# Patient Record
Sex: FEMALE | Race: BLACK OR AFRICAN AMERICAN | NOT HISPANIC OR LATINO | URBAN - METROPOLITAN AREA
[De-identification: names, ages, dates, MRNs, and addresses within clinical notes are randomized per-mention and may not be internally consistent; named-entity substitution may affect disease eponyms.]

---

## 2023-04-04 DIAGNOSIS — Z00.00 HEALTHCARE MAINTENANCE: Primary | ICD-10-CM

## 2023-04-04 RX ORDER — CANDESARTAN 32 MG/1
1 TABLET ORAL DAILY
COMMUNITY
Start: 2020-08-17 | End: 2023-07-25 | Stop reason: SDUPTHER

## 2023-04-04 RX ORDER — VALACYCLOVIR HYDROCHLORIDE 500 MG/1
500 TABLET, FILM COATED ORAL DAILY
Qty: 90 TABLET | Refills: 0 | Status: SHIPPED | OUTPATIENT
Start: 2023-04-04 | End: 2023-07-25 | Stop reason: SDUPTHER

## 2023-04-04 RX ORDER — VALACYCLOVIR HYDROCHLORIDE 500 MG/1
1 TABLET, FILM COATED ORAL DAILY
COMMUNITY
Start: 2014-10-13 | End: 2023-04-04 | Stop reason: SDUPTHER

## 2023-04-04 RX ORDER — AMLODIPINE BESYLATE 10 MG/1
1 TABLET ORAL DAILY
COMMUNITY
Start: 2021-01-15 | End: 2023-11-13 | Stop reason: SDUPTHER

## 2023-04-04 RX ORDER — NAPROXEN 500 MG/1
TABLET ORAL
COMMUNITY
Start: 2021-12-03 | End: 2024-01-22 | Stop reason: HOSPADM

## 2023-07-25 DIAGNOSIS — Z00.00 HEALTHCARE MAINTENANCE: ICD-10-CM

## 2023-07-25 DIAGNOSIS — I10 ESSENTIAL HYPERTENSION: Primary | ICD-10-CM

## 2023-07-26 RX ORDER — VALACYCLOVIR HYDROCHLORIDE 500 MG/1
500 TABLET, FILM COATED ORAL DAILY
Qty: 90 TABLET | Refills: 0 | Status: SHIPPED | OUTPATIENT
Start: 2023-07-26 | End: 2023-11-13 | Stop reason: SDUPTHER

## 2023-07-26 RX ORDER — CANDESARTAN 32 MG/1
32 TABLET ORAL DAILY
Qty: 30 TABLET | Refills: 1 | Status: SHIPPED | OUTPATIENT
Start: 2023-07-26 | End: 2023-10-11 | Stop reason: SDUPTHER

## 2023-10-10 DIAGNOSIS — Z00.00 HEALTHCARE MAINTENANCE: ICD-10-CM

## 2023-10-10 DIAGNOSIS — I10 ESSENTIAL HYPERTENSION: ICD-10-CM

## 2023-10-10 NOTE — TELEPHONE ENCOUNTER
LAST BSEEN 1/4/23, NEXT APPT 11/13  REFILLS CANDESARTAN VALACYCLOVIR       PH[facility]ACY: [facility]

## 2023-10-11 DIAGNOSIS — I10 ESSENTIAL HYPERTENSION: ICD-10-CM

## 2023-10-11 RX ORDER — CANDESARTAN 32 MG/1
32 TABLET ORAL DAILY
Qty: 30 TABLET | Refills: 1 | OUTPATIENT
Start: 2023-10-11 | End: 2023-12-10

## 2023-10-11 RX ORDER — CANDESARTAN 32 MG/1
32 TABLET ORAL DAILY
Qty: 30 TABLET | Refills: 0 | Status: SHIPPED | OUTPATIENT
Start: 2023-10-11 | End: 2023-11-13 | Stop reason: SDUPTHER

## 2023-10-11 RX ORDER — VALACYCLOVIR HYDROCHLORIDE 500 MG/1
500 TABLET, FILM COATED ORAL DAILY
Qty: 90 TABLET | Refills: 0 | OUTPATIENT
Start: 2023-10-11

## 2023-10-18 ENCOUNTER — TELEPHONE (OUTPATIENT)
Dept: PRIMARY CARE | Facility: CLINIC | Age: 48
End: 2023-10-18
Payer: COMMERCIAL

## 2023-11-13 ENCOUNTER — LAB (OUTPATIENT)
Dept: LAB | Facility: LAB | Age: 48
End: 2023-11-13
Payer: COMMERCIAL

## 2023-11-13 ENCOUNTER — OFFICE VISIT (OUTPATIENT)
Dept: PRIMARY CARE | Facility: CLINIC | Age: 48
End: 2023-11-13
Payer: COMMERCIAL

## 2023-11-13 VITALS
HEIGHT: 67 IN | DIASTOLIC BLOOD PRESSURE: 74 MMHG | RESPIRATION RATE: 16 BRPM | WEIGHT: 223.4 LBS | SYSTOLIC BLOOD PRESSURE: 132 MMHG | OXYGEN SATURATION: 97 % | BODY MASS INDEX: 35.06 KG/M2 | TEMPERATURE: 97.7 F | HEART RATE: 66 BPM

## 2023-11-13 DIAGNOSIS — E55.9 VITAMIN D DEFICIENCY: ICD-10-CM

## 2023-11-13 DIAGNOSIS — E78.2 MIXED HYPERLIPIDEMIA: ICD-10-CM

## 2023-11-13 DIAGNOSIS — Z00.00 HEALTHCARE MAINTENANCE: ICD-10-CM

## 2023-11-13 DIAGNOSIS — R73.9 HYPERGLYCEMIA: ICD-10-CM

## 2023-11-13 DIAGNOSIS — Z12.31 BREAST CANCER SCREENING BY MAMMOGRAM: ICD-10-CM

## 2023-11-13 DIAGNOSIS — I10 ESSENTIAL HYPERTENSION: Primary | ICD-10-CM

## 2023-11-13 DIAGNOSIS — I10 ESSENTIAL HYPERTENSION: ICD-10-CM

## 2023-11-13 DIAGNOSIS — Z00.00 PHYSICAL EXAM: ICD-10-CM

## 2023-11-13 DIAGNOSIS — B00.9 HERPES: ICD-10-CM

## 2023-11-13 PROBLEM — F32.A ANXIETY AND DEPRESSION: Status: ACTIVE | Noted: 2023-11-13

## 2023-11-13 PROBLEM — F41.9 ANXIETY AND DEPRESSION: Status: ACTIVE | Noted: 2023-11-13

## 2023-11-13 PROBLEM — D64.9 ANEMIA: Status: ACTIVE | Noted: 2023-11-13

## 2023-11-13 PROBLEM — E78.5 HYPERLIPIDEMIA: Status: ACTIVE | Noted: 2023-11-13

## 2023-11-13 PROBLEM — R73.03 PREDIABETES: Status: ACTIVE | Noted: 2023-11-13

## 2023-11-13 PROBLEM — E66.9 OBESITY (BMI 30-39.9): Status: ACTIVE | Noted: 2023-11-13

## 2023-11-13 LAB
25(OH)D3 SERPL-MCNC: 23 NG/ML (ref 30–100)
ALBUMIN SERPL BCP-MCNC: 4.3 G/DL (ref 3.4–5)
ALP SERPL-CCNC: 66 U/L (ref 33–110)
ALT SERPL W P-5'-P-CCNC: 14 U/L (ref 7–45)
ANION GAP SERPL CALC-SCNC: 10 MMOL/L (ref 10–20)
AST SERPL W P-5'-P-CCNC: 13 U/L (ref 9–39)
BASOPHILS # BLD AUTO: 0.03 X10*3/UL (ref 0–0.1)
BASOPHILS NFR BLD AUTO: 0.5 %
BILIRUB SERPL-MCNC: 0.5 MG/DL (ref 0–1.2)
BUN SERPL-MCNC: 15 MG/DL (ref 6–23)
CALCIUM SERPL-MCNC: 9.4 MG/DL (ref 8.6–10.6)
CHLORIDE SERPL-SCNC: 106 MMOL/L (ref 98–107)
CHOLEST SERPL-MCNC: 185 MG/DL (ref 0–199)
CHOLESTEROL/HDL RATIO: 5.8
CO2 SERPL-SCNC: 29 MMOL/L (ref 21–32)
CREAT SERPL-MCNC: 0.87 MG/DL (ref 0.5–1.05)
EOSINOPHIL # BLD AUTO: 0.07 X10*3/UL (ref 0–0.7)
EOSINOPHIL NFR BLD AUTO: 1.2 %
ERYTHROCYTE [DISTWIDTH] IN BLOOD BY AUTOMATED COUNT: 12.3 % (ref 11.5–14.5)
EST. AVERAGE GLUCOSE BLD GHB EST-MCNC: 114 MG/DL
GFR SERPL CREATININE-BSD FRML MDRD: 81 ML/MIN/1.73M*2
GLUCOSE SERPL-MCNC: 80 MG/DL (ref 74–99)
HBA1C MFR BLD: 5.6 %
HCT VFR BLD AUTO: 42.8 % (ref 36–46)
HDLC SERPL-MCNC: 31.7 MG/DL
HGB BLD-MCNC: 14 G/DL (ref 12–16)
HIV 1+2 AB+HIV1 P24 AG SERPL QL IA: NONREACTIVE
IMM GRANULOCYTES # BLD AUTO: 0.01 X10*3/UL (ref 0–0.7)
IMM GRANULOCYTES NFR BLD AUTO: 0.2 % (ref 0–0.9)
LDLC SERPL CALC-MCNC: 100 MG/DL
LYMPHOCYTES # BLD AUTO: 2.56 X10*3/UL (ref 1.2–4.8)
LYMPHOCYTES NFR BLD AUTO: 45.2 %
MCH RBC QN AUTO: 30.2 PG (ref 26–34)
MCHC RBC AUTO-ENTMCNC: 32.7 G/DL (ref 32–36)
MCV RBC AUTO: 92 FL (ref 80–100)
MONOCYTES # BLD AUTO: 0.44 X10*3/UL (ref 0.1–1)
MONOCYTES NFR BLD AUTO: 7.8 %
NEUTROPHILS # BLD AUTO: 2.55 X10*3/UL (ref 1.2–7.7)
NEUTROPHILS NFR BLD AUTO: 45.1 %
NON HDL CHOLESTEROL: 153 MG/DL (ref 0–149)
NRBC BLD-RTO: 0 /100 WBCS (ref 0–0)
PLATELET # BLD AUTO: 279 X10*3/UL (ref 150–450)
POTASSIUM SERPL-SCNC: 4.4 MMOL/L (ref 3.5–5.3)
PROT SERPL-MCNC: 7.3 G/DL (ref 6.4–8.2)
RBC # BLD AUTO: 4.63 X10*6/UL (ref 4–5.2)
SODIUM SERPL-SCNC: 141 MMOL/L (ref 136–145)
TRIGL SERPL-MCNC: 269 MG/DL (ref 0–149)
TSH SERPL-ACNC: 1.33 MIU/L (ref 0.44–3.98)
VLDL: 54 MG/DL (ref 0–40)
WBC # BLD AUTO: 5.7 X10*3/UL (ref 4.4–11.3)

## 2023-11-13 PROCEDURE — 3075F SYST BP GE 130 - 139MM HG: CPT | Performed by: FAMILY MEDICINE

## 2023-11-13 PROCEDURE — 99214 OFFICE O/P EST MOD 30 MIN: CPT | Performed by: FAMILY MEDICINE

## 2023-11-13 PROCEDURE — 36415 COLL VENOUS BLD VENIPUNCTURE: CPT

## 2023-11-13 PROCEDURE — 84443 ASSAY THYROID STIM HORMONE: CPT

## 2023-11-13 PROCEDURE — 82306 VITAMIN D 25 HYDROXY: CPT

## 2023-11-13 PROCEDURE — 1036F TOBACCO NON-USER: CPT | Performed by: FAMILY MEDICINE

## 2023-11-13 PROCEDURE — 80053 COMPREHEN METABOLIC PANEL: CPT

## 2023-11-13 PROCEDURE — 85025 COMPLETE CBC W/AUTO DIFF WBC: CPT

## 2023-11-13 PROCEDURE — 87389 HIV-1 AG W/HIV-1&-2 AB AG IA: CPT

## 2023-11-13 PROCEDURE — 83036 HEMOGLOBIN GLYCOSYLATED A1C: CPT

## 2023-11-13 PROCEDURE — 3078F DIAST BP <80 MM HG: CPT | Performed by: FAMILY MEDICINE

## 2023-11-13 PROCEDURE — 80061 LIPID PANEL: CPT

## 2023-11-13 RX ORDER — AMLODIPINE BESYLATE 10 MG/1
10 TABLET ORAL DAILY
Qty: 90 TABLET | Refills: 0 | Status: SHIPPED | OUTPATIENT
Start: 2023-11-13 | End: 2024-06-07 | Stop reason: SDUPTHER

## 2023-11-13 RX ORDER — VALACYCLOVIR HYDROCHLORIDE 500 MG/1
500 TABLET, FILM COATED ORAL DAILY
Qty: 90 TABLET | Refills: 1 | Status: SHIPPED | OUTPATIENT
Start: 2023-11-13 | End: 2024-06-07 | Stop reason: SDUPTHER

## 2023-11-13 RX ORDER — CANDESARTAN 32 MG/1
32 TABLET ORAL DAILY
Qty: 90 TABLET | Refills: 1 | Status: SHIPPED | OUTPATIENT
Start: 2023-11-13 | End: 2024-06-07 | Stop reason: SDUPTHER

## 2023-11-13 ASSESSMENT — COLUMBIA-SUICIDE SEVERITY RATING SCALE - C-SSRS
1. IN THE PAST MONTH, HAVE YOU WISHED YOU WERE DEAD OR WISHED YOU COULD GO TO SLEEP AND NOT WAKE UP?: NO
2. HAVE YOU ACTUALLY HAD ANY THOUGHTS OF KILLING YOURSELF?: NO
6. HAVE YOU EVER DONE ANYTHING, STARTED TO DO ANYTHING, OR PREPARED TO DO ANYTHING TO END YOUR LIFE?: NO

## 2023-11-13 ASSESSMENT — ENCOUNTER SYMPTOMS
LOSS OF SENSATION IN FEET: 0
OCCASIONAL FEELINGS OF UNSTEADINESS: 0
DEPRESSION: 0

## 2023-11-13 ASSESSMENT — PATIENT HEALTH QUESTIONNAIRE - PHQ9
2. FEELING DOWN, DEPRESSED OR HOPELESS: NOT AT ALL
1. LITTLE INTEREST OR PLEASURE IN DOING THINGS: NOT AT ALL
SUM OF ALL RESPONSES TO PHQ9 QUESTIONS 1 AND 2: 0

## 2023-11-13 NOTE — PROGRESS NOTES
Subjective   Patient ID: Krystyna Arroyo is a 48 y.o. female who presents for Follow-up.    HPI   The patient presents to the clinic for a 3-month follow-up. She has past medical history of hypertension.    She has been compliant with medication and denies any side-effects to current medication.    The patient's blood pressure was when checked in the clinic today. She has been using amlodipine 10 mg and candersartan 32 mg to control her blood pressure. The patient also inquires about the accuracy of phone applications that monitor blood pressure. She does have a blood pressure cuff to monitor at home which she uses to monitor her BP occasionally.  She states has been in very good range    She reports that she lost weight (current weight: 223 lbs, down 7 lbs from 01/04/2023).  She did see dietician for several visits and has helped her    The patient has stopped taking her vitamin D supplement once she ran out of her current supply. The patient also reports that she discontinued use of her vitamin B complex supplement as she had concerns with bright yellow urine.  She wants D level checked w labs today.  Assured her that normal for B complex to cause her urine to turn bright yellow and why    The patient reports that she visited a periodontist as she was diagnosed with gum disease. She will have surgery regarding gum disease in the near future (~next 90 days). The patient states that she wants straight teeth and was advised to remove 2 teeth from her specialist. She does not want to have any unnecessary procedures and plans to evaluate her options.    She has family history of breast cancer with her mother (current age: 68 y/o).  Recently dx with stage 4 breast cancer and undergoing tx.  Pt is behind on mamm by 6 months, will order for her today.  She is scheduling w Dr Hayes/Timothy for her annual gyn OV    Review of Systems    Objective   /74 (BP Location: Right arm, Patient Position: Sitting, BP Cuff Size:  "Large adult)   Pulse 66   Temp 36.5 °C (97.7 °F)   Resp 16   Ht 1.702 m (5' 7\")   Wt 101 kg (223 lb 6.4 oz)   SpO2 97%   BMI 34.99 kg/m²     Physical Exam  Neck:      Vascular: No carotid bruit.   Cardiovascular:      Rate and Rhythm: Normal rate and regular rhythm.      Pulses: Normal pulses.      Heart sounds: Normal heart sounds.   Pulmonary:      Effort: Pulmonary effort is normal.      Breath sounds: Normal breath sounds.   Musculoskeletal:         General: Normal range of motion.      Cervical back: Normal range of motion.      Right lower leg: No edema.      Left lower leg: No edema.   Skin:     General: Skin is warm and dry.   Neurological:      Mental Status: She is alert and oriented to person, place, and time.   Psychiatric:         Mood and Affect: Mood normal.         Behavior: Behavior normal.         Thought Content: Thought content normal.         Judgment: Judgment normal.         Assessment/Plan   Problem List Items Addressed This Visit             ICD-10-CM    Herpes B00.9    Hyperlipidemia E78.5    Vitamin D deficiency E55.9    Relevant Orders    Vitamin D 25-Hydroxy,Total (for eval of Vitamin D levels)     Other Visit Diagnoses         Codes    Essential hypertension    -  Primary I10    Relevant Medications    candesartan (Atacand) 32 mg tablet    amLODIPine (Norvasc) 10 mg tablet    Other Relevant Orders    TSH with reflex to Free T4 if abnormal    Hyperglycemia     R73.9    Relevant Orders    Hemoglobin A1C    TSH with reflex to Free T4 if abnormal    Breast cancer screening by mammogram     Z12.31    Relevant Orders    BI mammo bilateral screening tomosynthesis    Healthcare maintenance     Z00.00    Relevant Medications    valACYclovir (Valtrex) 500 mg tablet    Physical exam     Z00.00    Relevant Orders    CBC and Auto Differential    Comprehensive Metabolic Panel    Lipid Panel    HIV 1/2 Antigen/Antibody Screen with Reflex to Confirmation               Labs (CBC, CMP, A1C, lipid " panel, TSH, vitamin D, HIV 1/2 antigen/antibody screen) were ordered for the patient. She is fasting and intends to get her labs done today. The clinic will call the patient upon receiving the results of her labs.    A bilateral mammogram screening was ordered for the patient. She will schedule with gyn for OV    The accuracy of phone application blood pressure monitors were discussed with the patient. In addition, the proper method of measuring BP with a traditional monitor was discussed with the patient. She was advised to check the accuracy of her application with a traditional BP monitor if she prefers to use phone applications.    The patient received refills for her current medication (amlodipine 10 mg, candersartan 32 mg, valacyclovir 500 mg). She was advised to continue using medication as previously directed.    In regards to her concerns with bright yellow urine when taking her vitamin B complex supplement, the patient was advised that this is normal and to continue taking the supplement as previously directed.  Checking D level and will advise on dose to restart also    The patient had a cologuard on 03/30/2023. She was advised to have a repeat cologuard/colonoscopy in 3 years. She was advised to consider having a colonoscopy screening at least one time.    The patient received a flu vaccine in the clinic today.    6 month follow up or prn sooner    Scribe Attestation  By signing my name below, I, Javi Teague , [employer]   attest that this documentation has been prepared under the direction and in the presence of Kika Fernandez DO.

## 2023-11-20 ENCOUNTER — TELEPHONE (OUTPATIENT)
Dept: PRIMARY CARE | Facility: CLINIC | Age: 48
End: 2023-11-20
Payer: COMMERCIAL

## 2023-11-20 NOTE — TELEPHONE ENCOUNTER
----- Message from Kika Fernandez DO sent at 11/16/2023  7:41 PM EST -----  Report to pt her blood count is normal, her chemistries are normal including hgba1c and BS.  Cholesterol good range and TG high.  HIV neg

## 2023-11-22 ENCOUNTER — APPOINTMENT (OUTPATIENT)
Dept: RADIOLOGY | Facility: CLINIC | Age: 48
End: 2023-11-22
Payer: COMMERCIAL

## 2023-11-22 ENCOUNTER — ANCILLARY PROCEDURE (OUTPATIENT)
Dept: RADIOLOGY | Facility: CLINIC | Age: 48
End: 2023-11-22
Payer: COMMERCIAL

## 2023-11-22 VITALS — HEIGHT: 67 IN | WEIGHT: 222.66 LBS | BODY MASS INDEX: 34.95 KG/M2

## 2023-11-22 DIAGNOSIS — Z12.31 BREAST CANCER SCREENING BY MAMMOGRAM: ICD-10-CM

## 2023-11-22 PROCEDURE — 77067 SCR MAMMO BI INCL CAD: CPT | Performed by: RADIOLOGY

## 2023-11-22 PROCEDURE — 77063 BREAST TOMOSYNTHESIS BI: CPT

## 2023-11-22 PROCEDURE — 77067 SCR MAMMO BI INCL CAD: CPT

## 2023-11-22 PROCEDURE — 77063 BREAST TOMOSYNTHESIS BI: CPT | Performed by: RADIOLOGY

## 2023-11-30 ENCOUNTER — TELEPHONE (OUTPATIENT)
Dept: PRIMARY CARE | Facility: CLINIC | Age: 48
End: 2023-11-30
Payer: COMMERCIAL

## 2023-11-30 NOTE — TELEPHONE ENCOUNTER
----- Message from Kika Fernandez DO sent at 11/29/2023  5:33 PM EST -----  Report to pt her mamm is wnl and repeat one year

## 2024-01-22 ENCOUNTER — OFFICE VISIT (OUTPATIENT)
Dept: OBSTETRICS AND GYNECOLOGY | Facility: CLINIC | Age: 49
End: 2024-01-22
Payer: COMMERCIAL

## 2024-01-22 VITALS
HEIGHT: 67 IN | SYSTOLIC BLOOD PRESSURE: 135 MMHG | DIASTOLIC BLOOD PRESSURE: 77 MMHG | WEIGHT: 215 LBS | BODY MASS INDEX: 33.74 KG/M2

## 2024-01-22 DIAGNOSIS — Z01.419 ENCOUNTER FOR ANNUAL ROUTINE GYNECOLOGICAL EXAMINATION: ICD-10-CM

## 2024-01-22 LAB
POC BLOOD, URINE: NEGATIVE
POC GLUCOSE, URINE: NEGATIVE MG/DL
POC KETONES, URINE: NEGATIVE MG/DL
POC LEUKOCYTES, URINE: NEGATIVE
POC NITRITE,URINE: NEGATIVE
POC PROTEIN, URINE: NEGATIVE MG/DL

## 2024-01-22 PROCEDURE — 3075F SYST BP GE 130 - 139MM HG: CPT | Performed by: OBSTETRICS & GYNECOLOGY

## 2024-01-22 PROCEDURE — 99396 PREV VISIT EST AGE 40-64: CPT | Performed by: OBSTETRICS & GYNECOLOGY

## 2024-01-22 PROCEDURE — 3078F DIAST BP <80 MM HG: CPT | Performed by: OBSTETRICS & GYNECOLOGY

## 2024-01-22 PROCEDURE — 1036F TOBACCO NON-USER: CPT | Performed by: OBSTETRICS & GYNECOLOGY

## 2024-01-22 PROCEDURE — 99386 PREV VISIT NEW AGE 40-64: CPT | Performed by: OBSTETRICS & GYNECOLOGY

## 2024-01-22 ASSESSMENT — PAIN SCALES - GENERAL: PAINLEVEL: 0-NO PAIN

## 2024-01-22 ASSESSMENT — PATIENT HEALTH QUESTIONNAIRE - PHQ9
SUM OF ALL RESPONSES TO PHQ9 QUESTIONS 1 AND 2: 1
10. IF YOU CHECKED OFF ANY PROBLEMS, HOW DIFFICULT HAVE THESE PROBLEMS MADE IT FOR YOU TO DO YOUR WORK, TAKE CARE OF THINGS AT HOME, OR GET ALONG WITH OTHER PEOPLE: SOMEWHAT DIFFICULT
2. FEELING DOWN, DEPRESSED OR HOPELESS: NOT AT ALL
1. LITTLE INTEREST OR PLEASURE IN DOING THINGS: SEVERAL DAYS

## 2024-01-22 NOTE — PROGRESS NOTES
Subjective   Patient ID: Krystyna Arroyo is a 48 y.o. female who presents for Annual Exam (Pt here for Annual Pap, Last Pap 12/29/22 wnl. HPV negative, Mammogram 11/22/23 Negative.).  HPI  Patient is a 48-year-old female nulligravida well-known to the practice here for her annual exam.    Recent mammogram was normal.    Pap smear 1 year ago was HPV negative    She does take valacyclovir daily for suppression of HSV.  Rare outbreaks  Review of Systems   All other systems reviewed and are negative.      Objective   Physical Exam  Thyroid: No thyroid megaly    Cardiovascular: Regular rate and rhythm    Lungs: Clear to auscultation    Breasts: No skin changes no masses palpated    Abdomen: Soft nontender bowel sounds positive no masses palpated    Extremities nontender no edema    Pelvic exam: External genitalia Bartholin's urethra and Harrell's are normal.  Vaginal exam shows no lesions or discharge.  Pelvic bimanual exam reveals no masses or tenderness.  Assessment/Plan   Normal annual exam.    Pap smear due in 2025    Mammogram ordered for next year    Follow-up in 1 year or as needed         Kyle Aguirre MD 01/22/24 10:52 AM

## 2024-05-10 ENCOUNTER — APPOINTMENT (OUTPATIENT)
Dept: PRIMARY CARE | Facility: CLINIC | Age: 49
End: 2024-05-10
Payer: COMMERCIAL

## 2024-06-07 ENCOUNTER — OFFICE VISIT (OUTPATIENT)
Dept: PRIMARY CARE | Facility: CLINIC | Age: 49
End: 2024-06-07
Payer: COMMERCIAL

## 2024-06-07 VITALS
OXYGEN SATURATION: 94 % | HEART RATE: 74 BPM | RESPIRATION RATE: 18 BRPM | WEIGHT: 238 LBS | TEMPERATURE: 97.7 F | BODY MASS INDEX: 37.35 KG/M2 | HEIGHT: 67 IN | SYSTOLIC BLOOD PRESSURE: 122 MMHG | DIASTOLIC BLOOD PRESSURE: 83 MMHG

## 2024-06-07 DIAGNOSIS — E66.9 OBESITY (BMI 30-39.9): ICD-10-CM

## 2024-06-07 DIAGNOSIS — B00.9 HERPES: ICD-10-CM

## 2024-06-07 DIAGNOSIS — E78.2 MIXED HYPERLIPIDEMIA: ICD-10-CM

## 2024-06-07 DIAGNOSIS — Z00.00 HEALTHCARE MAINTENANCE: ICD-10-CM

## 2024-06-07 DIAGNOSIS — I10 ESSENTIAL HYPERTENSION: Primary | ICD-10-CM

## 2024-06-07 DIAGNOSIS — R73.03 PREDIABETES: ICD-10-CM

## 2024-06-07 PROCEDURE — 99214 OFFICE O/P EST MOD 30 MIN: CPT | Performed by: FAMILY MEDICINE

## 2024-06-07 PROCEDURE — 3079F DIAST BP 80-89 MM HG: CPT | Performed by: FAMILY MEDICINE

## 2024-06-07 PROCEDURE — 1036F TOBACCO NON-USER: CPT | Performed by: FAMILY MEDICINE

## 2024-06-07 PROCEDURE — 3074F SYST BP LT 130 MM HG: CPT | Performed by: FAMILY MEDICINE

## 2024-06-07 RX ORDER — CANDESARTAN 32 MG/1
32 TABLET ORAL DAILY
Qty: 90 TABLET | Refills: 1 | Status: SHIPPED | OUTPATIENT
Start: 2024-06-07

## 2024-06-07 RX ORDER — AMLODIPINE BESYLATE 10 MG/1
10 TABLET ORAL DAILY
Qty: 90 TABLET | Refills: 0 | Status: SHIPPED | OUTPATIENT
Start: 2024-06-07

## 2024-06-07 RX ORDER — VALACYCLOVIR HYDROCHLORIDE 500 MG/1
500 TABLET, FILM COATED ORAL DAILY
Qty: 90 TABLET | Refills: 1 | Status: SHIPPED | OUTPATIENT
Start: 2024-06-07

## 2024-06-07 ASSESSMENT — PATIENT HEALTH QUESTIONNAIRE - PHQ9
1. LITTLE INTEREST OR PLEASURE IN DOING THINGS: NOT AT ALL
2. FEELING DOWN, DEPRESSED OR HOPELESS: NOT AT ALL
SUM OF ALL RESPONSES TO PHQ9 QUESTIONS 1 AND 2: 0

## 2024-06-07 ASSESSMENT — PAIN SCALES - GENERAL: PAINLEVEL: 0-NO PAIN

## 2024-06-07 NOTE — PROGRESS NOTES
"Subjective   Patient ID: Krystyna Arroyo is a 49 y.o. female who presents for 6 month f/up.    HPI   The patient presents to the clinic for a 6-month follow-up. She has past medical history of hyperlipidemia, hypertension, prediabetes, vitamin D deficiency, anemia, HSV, anxiety, and depression.    Her blood pressure (122/83) was within good range when checked in the clinic today. She has not been monitoring her blood pressure at home. She continues on amlodipine 10 mg daily and candesartan 32 mg daily for treatment of hypertension. She denies any side-effects to her amlodipine and/or candesartan medications.  She states takes them daily    She is continuing with PT and exercising on her own as well.  She continues working on eating healthy diet but has gained back some of the weight she lost    She also takes valacyclovir 500 mg daily for treatment/prevention of herpes infection. She denies any side-effects to her valacyclovir medication.  She has not had any breakouts    Her most recent mammogram screening was done in November 2023. Her most recent Cologuard screening was done in March 2023. Her most recent gynecological exam was done in December 2022.    Review of Systems    Objective   /83   Pulse 74   Temp 36.5 °C (97.7 °F)   Resp 18   Ht 1.702 m (5' 7\")   Wt 108 kg (238 lb)   SpO2 94%   BMI 37.28 kg/m²     Physical Exam  Constitutional:       Appearance: Normal appearance.   Neck:      Vascular: No carotid bruit.   Cardiovascular:      Rate and Rhythm: Normal rate and regular rhythm.      Pulses: Normal pulses.      Heart sounds: Normal heart sounds.   Pulmonary:      Effort: Pulmonary effort is normal.      Breath sounds: Normal breath sounds.   Musculoskeletal:         General: Normal range of motion.      Cervical back: Normal range of motion.      Right lower leg: No edema.      Left lower leg: No edema.   Lymphadenopathy:      Cervical: No cervical adenopathy.   Skin:     General: Skin is warm " and dry.   Neurological:      Mental Status: She is alert and oriented to person, place, and time.   Psychiatric:         Mood and Affect: Mood normal.         Thought Content: Thought content normal.         Judgment: Judgment normal.       Assessment/Plan   Problem List Items Addressed This Visit             ICD-10-CM    Herpes B00.9    Hyperlipidemia E78.5    Relevant Orders    Lipid Panel    Obesity (BMI 30-39.9) E66.9    Prediabetes R73.03    Relevant Orders    Hemoglobin A1C     Other Visit Diagnoses         Codes    Essential hypertension    -  Primary I10    Relevant Medications    candesartan (Atacand) 32 mg tablet    amLODIPine (Norvasc) 10 mg tablet    Other Relevant Orders    Comprehensive Metabolic Panel    CBC and Auto Differential    Healthcare maintenance     Z00.00    Relevant Medications    valACYclovir (Valtrex) 500 mg tablet               Labs (CBC, CMP, A1C, lipid panel) were ordered for the patient to complete before her next visit (~6 months).    The patient received refills for current medication (amlodipine 10 mg, candesartan 32 mg, valacyclovir 500 mg). She was instructed to continue taking medication as previously directed.    Discussed diet and exercise to help her w weight loss goals and fitness    She will follow-up in 6 months, unless otherwise needed.  Lab prior    Scribe Attestation  By signing my name below, IJavi , Scrsherron   attest that this documentation has been prepared under the direction and in the presence of Kika Fernandez DO.

## 2024-12-06 ENCOUNTER — APPOINTMENT (OUTPATIENT)
Dept: PRIMARY CARE | Facility: CLINIC | Age: 49
End: 2024-12-06
Payer: COMMERCIAL

## 2024-12-06 ENCOUNTER — LAB (OUTPATIENT)
Dept: LAB | Facility: LAB | Age: 49
End: 2024-12-06
Payer: COMMERCIAL

## 2024-12-06 VITALS
TEMPERATURE: 98.2 F | HEART RATE: 71 BPM | OXYGEN SATURATION: 97 % | DIASTOLIC BLOOD PRESSURE: 70 MMHG | BODY MASS INDEX: 35.94 KG/M2 | RESPIRATION RATE: 18 BRPM | WEIGHT: 229 LBS | HEIGHT: 67 IN | SYSTOLIC BLOOD PRESSURE: 132 MMHG

## 2024-12-06 DIAGNOSIS — I10 ESSENTIAL HYPERTENSION: Primary | ICD-10-CM

## 2024-12-06 DIAGNOSIS — E55.9 VITAMIN D DEFICIENCY: ICD-10-CM

## 2024-12-06 DIAGNOSIS — E78.5 HYPERLIPIDEMIA, UNSPECIFIED HYPERLIPIDEMIA TYPE: ICD-10-CM

## 2024-12-06 DIAGNOSIS — Z00.00 HEALTHCARE MAINTENANCE: ICD-10-CM

## 2024-12-06 DIAGNOSIS — M79.672 LEFT FOOT PAIN: ICD-10-CM

## 2024-12-06 DIAGNOSIS — F41.9 ANXIETY AND DEPRESSION: ICD-10-CM

## 2024-12-06 DIAGNOSIS — I10 ESSENTIAL HYPERTENSION: ICD-10-CM

## 2024-12-06 DIAGNOSIS — Z12.31 BREAST CANCER SCREENING BY MAMMOGRAM: ICD-10-CM

## 2024-12-06 DIAGNOSIS — B00.9 HERPES: ICD-10-CM

## 2024-12-06 DIAGNOSIS — F32.A ANXIETY AND DEPRESSION: ICD-10-CM

## 2024-12-06 DIAGNOSIS — Z00.00 PHYSICAL EXAM: ICD-10-CM

## 2024-12-06 DIAGNOSIS — E78.2 MIXED HYPERLIPIDEMIA: ICD-10-CM

## 2024-12-06 DIAGNOSIS — R73.03 PREDIABETES: ICD-10-CM

## 2024-12-06 LAB
25(OH)D3 SERPL-MCNC: 35 NG/ML (ref 30–100)
ALBUMIN SERPL BCP-MCNC: 4.2 G/DL (ref 3.4–5)
ALP SERPL-CCNC: 76 U/L (ref 33–110)
ALT SERPL W P-5'-P-CCNC: 12 U/L (ref 7–45)
ANION GAP SERPL CALC-SCNC: 11 MMOL/L (ref 10–20)
AST SERPL W P-5'-P-CCNC: 14 U/L (ref 9–39)
BASOPHILS # BLD AUTO: 0.04 X10*3/UL (ref 0–0.1)
BASOPHILS NFR BLD AUTO: 0.7 %
BILIRUB SERPL-MCNC: 0.5 MG/DL (ref 0–1.2)
BUN SERPL-MCNC: 10 MG/DL (ref 6–23)
CALCIUM SERPL-MCNC: 9.4 MG/DL (ref 8.6–10.6)
CHLORIDE SERPL-SCNC: 104 MMOL/L (ref 98–107)
CHOLEST SERPL-MCNC: 200 MG/DL (ref 0–199)
CHOLESTEROL/HDL RATIO: 6.6
CO2 SERPL-SCNC: 29 MMOL/L (ref 21–32)
CREAT SERPL-MCNC: 0.85 MG/DL (ref 0.5–1.05)
EGFRCR SERPLBLD CKD-EPI 2021: 83 ML/MIN/1.73M*2
EOSINOPHIL # BLD AUTO: 0.07 X10*3/UL (ref 0–0.7)
EOSINOPHIL NFR BLD AUTO: 1.2 %
ERYTHROCYTE [DISTWIDTH] IN BLOOD BY AUTOMATED COUNT: 12.6 % (ref 11.5–14.5)
EST. AVERAGE GLUCOSE BLD GHB EST-MCNC: 111 MG/DL
GLUCOSE SERPL-MCNC: 100 MG/DL (ref 74–99)
HBA1C MFR BLD: 5.5 %
HCT VFR BLD AUTO: 42.3 % (ref 36–46)
HDLC SERPL-MCNC: 30.3 MG/DL
HGB BLD-MCNC: 14.2 G/DL (ref 12–16)
HIV 1+2 AB+HIV1 P24 AG SERPL QL IA: NONREACTIVE
IMM GRANULOCYTES # BLD AUTO: 0.01 X10*3/UL (ref 0–0.7)
IMM GRANULOCYTES NFR BLD AUTO: 0.2 % (ref 0–0.9)
LDLC SERPL CALC-MCNC: 140 MG/DL
LYMPHOCYTES # BLD AUTO: 2.47 X10*3/UL (ref 1.2–4.8)
LYMPHOCYTES NFR BLD AUTO: 40.9 %
MCH RBC QN AUTO: 30.3 PG (ref 26–34)
MCHC RBC AUTO-ENTMCNC: 33.6 G/DL (ref 32–36)
MCV RBC AUTO: 90 FL (ref 80–100)
MONOCYTES # BLD AUTO: 0.61 X10*3/UL (ref 0.1–1)
MONOCYTES NFR BLD AUTO: 10.1 %
NEUTROPHILS # BLD AUTO: 2.84 X10*3/UL (ref 1.2–7.7)
NEUTROPHILS NFR BLD AUTO: 46.9 %
NON HDL CHOLESTEROL: 170 MG/DL (ref 0–149)
NRBC BLD-RTO: 0 /100 WBCS (ref 0–0)
PLATELET # BLD AUTO: 292 X10*3/UL (ref 150–450)
POTASSIUM SERPL-SCNC: 4.3 MMOL/L (ref 3.5–5.3)
PROT SERPL-MCNC: 7.5 G/DL (ref 6.4–8.2)
RBC # BLD AUTO: 4.68 X10*6/UL (ref 4–5.2)
SODIUM SERPL-SCNC: 140 MMOL/L (ref 136–145)
TRIGL SERPL-MCNC: 151 MG/DL (ref 0–149)
VLDL: 30 MG/DL (ref 0–40)
WBC # BLD AUTO: 6 X10*3/UL (ref 4.4–11.3)

## 2024-12-06 PROCEDURE — 80061 LIPID PANEL: CPT

## 2024-12-06 PROCEDURE — 83036 HEMOGLOBIN GLYCOSYLATED A1C: CPT

## 2024-12-06 PROCEDURE — 82306 VITAMIN D 25 HYDROXY: CPT

## 2024-12-06 PROCEDURE — 87389 HIV-1 AG W/HIV-1&-2 AB AG IA: CPT

## 2024-12-06 PROCEDURE — 80053 COMPREHEN METABOLIC PANEL: CPT

## 2024-12-06 PROCEDURE — 36415 COLL VENOUS BLD VENIPUNCTURE: CPT

## 2024-12-06 PROCEDURE — 85025 COMPLETE CBC W/AUTO DIFF WBC: CPT

## 2024-12-06 RX ORDER — VALACYCLOVIR HYDROCHLORIDE 500 MG/1
500 TABLET, FILM COATED ORAL DAILY
Qty: 90 TABLET | Refills: 1 | Status: SHIPPED | OUTPATIENT
Start: 2024-12-06

## 2024-12-06 RX ORDER — CANDESARTAN 32 MG/1
32 TABLET ORAL DAILY
Qty: 90 TABLET | Refills: 1 | Status: SHIPPED | OUTPATIENT
Start: 2024-12-06

## 2024-12-06 RX ORDER — AMLODIPINE BESYLATE 10 MG/1
10 TABLET ORAL DAILY
Qty: 90 TABLET | Refills: 1 | Status: SHIPPED | OUTPATIENT
Start: 2024-12-06

## 2024-12-06 ASSESSMENT — PAIN SCALES - GENERAL: PAINLEVEL_OUTOF10: 0-NO PAIN

## 2024-12-06 NOTE — PROGRESS NOTES
"Subjective   Patient ID: Krystyna Arroyo is a 49 y.o. female who presents for 6 month f/up.    HPI   The patient presents to the clinic for a 6-month follow-up. She has past medical history of hyperlipidemia, hypertension, prediabetes, vitamin D deficiency, anemia, HSV, anxiety, and depression.    Her blood pressure (132/70) was within good range when checked in the clinic today. She continues on amlodipine 10 mg daily and candesartan 32 mg daily for treatment of hypertension. She denies any side-effects to her amlodipine and/or candesartan medications. She denies any chest pain and/or SOB symptoms.  She states at times not compliant but when takes daily her bp is well controlled    She is on valacylovir 500 mg daily for treatment/prevention of HSV outbreaks. She denies any side-effects to her valacylovir medication.    The patient reports that she has been suffering from poor sleep recently. She has been stressed as her mother had been diagnosed with stage IV breast cancer recently. She has been consulting with a counselor regularly to improve her mental health during this time.    The patient inquires about receiving a referral to podiatry for further evaluation of a left foot bunion. It causes her pain at times    She also inquires about receiving a referral to a dietician as the patient wants to begin on healthy meal plan (current weight: 229 lbs, current BMI: 35.87 kg/m2).    Her most recent mammogram screening was done in November 2023. Her most recent gynecological exam was done in December 2022. Her most recent Cologuard screening was done in March 2023.  She is planning on scheduling w Dr Aguirre in first half of 2025.  Will order her mammogram today since due    Review of Systems    Objective   /70   Pulse 71   Temp 36.8 °C (98.2 °F)   Resp 18   Ht 1.702 m (5' 7\")   Wt 104 kg (229 lb)   SpO2 97%   BMI 35.87 kg/m²     Physical Exam  Neck:      Vascular: No carotid bruit.   Cardiovascular:      " Rate and Rhythm: Normal rate and regular rhythm.      Pulses: Normal pulses.      Heart sounds: Normal heart sounds.   Pulmonary:      Effort: Pulmonary effort is normal.      Breath sounds: Normal breath sounds.   Musculoskeletal:         General: Normal range of motion.      Cervical back: Normal range of motion.      Right lower leg: No edema.      Left lower leg: No edema.   Skin:     General: Skin is warm and dry.   Neurological:      Mental Status: She is alert and oriented to person, place, and time.   Psychiatric:         Mood and Affect: Mood normal.         Thought Content: Thought content normal.         Judgment: Judgment normal.         Assessment/Plan   Problem List Items Addressed This Visit             ICD-10-CM    Anxiety and depression F41.9, F32.A    Herpes B00.9    Hyperlipidemia E78.5    Relevant Orders    Referral to Nutrition Services    Vitamin D deficiency E55.9    Relevant Orders    Vitamin D 25-Hydroxy,Total (for eval of Vitamin D levels) (Completed)     Other Visit Diagnoses         Codes    Essential hypertension    -  Primary I10    Relevant Medications    amLODIPine (Norvasc) 10 mg tablet    candesartan (Atacand) 32 mg tablet    Left foot pain     M79.672    Relevant Orders    Referral to Podiatry    Breast cancer screening by mammogram     Z12.31    Relevant Orders    BI mammo bilateral screening tomosynthesis    Physical exam     Z00.00    Relevant Orders    HIV 1/2 Antigen/Antibody Screen with Reflex to Confirmation (Completed)    Healthcare maintenance     Z00.00    Relevant Medications    valACYclovir (Valtrex) 500 mg tablet               Labs (CMP, A1C, CBC, lipid panel) were ordered for the patient on 06/07/2024. These lab orders remain active. Additional labs (HIV 1/2 antigen/antibody screen) were also ordered for the patient. She intends to complete all of these labs later today. The clinic will contact the patient upon receiving her lab results.    In regards to her  hypertension, the patient's blood pressure seems well controlled on her current anti-hypertensive medication regimen. Consequently, she received refills for her anti-hypertensive medication (amlodipine 10 mg/candesartan 32 mg) and she was instructed to continue taking her anti-hypertensive medication as previously directed. Continue monitoring blood pressure at home regularly.    In regards to prevention of HSV, the patient received a refill for her valacylovir 500 mg medication. She was instructed to continue taking valacyclovir medication as previously directed.    In regards to concerns with her mother's breast cancer diagnosis, the patient will consider consulting with her OB-GYN specialist (Dr. Aguirre) to determine if she (patient) requires further testing/screening. For now, a repeat bilateral mammogram screening was ordered for the patient. She intends to complete this screening order soon. The clinic will contact the patient upon receiving her screening results.     In regards to concerns with left foot bunion, the patient received a referral to podiatry for further evaluation of this condition. In the meantime, she was instructed to continue monitoring symptoms for improvement/exacerbation.    In regards to her inquiry for a meal plan, the patient received a referral to nutrition services so that she can develop a healthy meal plan with the specialist. In the meantime, she was instructed to contineu monitoring weight at home regularly.    She will follow-up in 6 months, unless otherwise needed.    Scribe Attestation  By signing my name below, IJavi Scribe   attest that this documentation has been prepared under the direction and in the presence of Kika Fernandez DO.

## 2024-12-11 ENCOUNTER — TELEPHONE (OUTPATIENT)
Dept: PRIMARY CARE | Facility: CLINIC | Age: 49
End: 2024-12-11
Payer: COMMERCIAL

## 2024-12-11 NOTE — TELEPHONE ENCOUNTER
----- Message from Kika Fernandez sent at 12/9/2024  7:45 PM EST -----  Report to patient her blood count is normal.  Her chemistries are normal, blood sugar is 100 and hemoglobin A1c is 5.5 which is normal.  Her LDL cholesterol is high at 140, should be under 100.  If unable to decrease with diet and exercise will discuss medication.  Her HIV screen is negative.  Her vitamin D level is improved at 35 and she should take 2000 IUs D3 daily as previously discussed.

## 2024-12-20 ENCOUNTER — TELEMEDICINE CLINICAL SUPPORT (OUTPATIENT)
Dept: NUTRITION | Facility: HOSPITAL | Age: 49
End: 2024-12-20
Payer: COMMERCIAL

## 2024-12-20 VITALS — BODY MASS INDEX: 35.87 KG/M2 | HEIGHT: 67 IN

## 2024-12-20 DIAGNOSIS — E78.5 HYPERLIPIDEMIA, UNSPECIFIED HYPERLIPIDEMIA TYPE: Primary | ICD-10-CM

## 2024-12-20 PROCEDURE — 97802 MEDICAL NUTRITION INDIV IN: CPT

## 2024-12-20 NOTE — PROGRESS NOTES
"Nutrition Assessment     Reason for Visit:  Krystyna Arroyo is a 49 y.o. female who presents for Hyperlipidemia and Pre-Diabetes    Anthropometrics:  Anthropometrics  Height: 170.2 cm (5' 7\")  Weight:  (229lb per pt chart.)  IBW/kg (Dietitian Calculated): 61.2 kg ([place])  Percent of IBW: 169 %  Weight Change  Weight History / % Weight Change: Pt reports no recent weight changes. No significant weight changes per pt chart.  Significant Weight Loss: No     Wt Readings from Last 10 Encounters:   12/06/24 104 kg (229 lb)   06/07/24 108 kg (238 lb)   01/22/24 97.5 kg (215 lb)   11/22/23 101 kg (222 lb 10.6 oz)   11/13/23 101 kg (223 lb 6.4 oz)   01/04/23 104 kg (230 lb)   12/29/22 106 kg (234 lb)   08/31/22 104 kg (229 lb)   07/12/22 103 kg (226 lb)   01/07/22 104 kg (230 lb 2 oz)       Food And Nutrient Intake:  Food and Nutrient History  Food and Nutrient History: Spoke with pt via phone for visit today. She states that her A1c, BG and lipid levels were high at recent checkup and she would like to make a eating plan to help her correct those labs. Pt is eager to correct diet so she can improve health. Pt eats when she is hungry, and does not follow a usual 3 meals per day schedule. Pt goes back for labs in 6 months with her PCP.  Energy Intake: Good > 75 %  Fluid Intake: Water, hot tea with lemon, green tea, gingerale  Food Allergy: NKFA  GI Symptoms: none     Food Intake  Meal 1: Breakfast- Usually skips  Meal 2: Lunch- ground chicken ander with cheese, fried potatoes, bell peppers and onions  Meal 3: Dinner- Fruit  Snacks: Potato chips    Food Preparation  Cooking: Patient  Grocery Shopping: Patient  Dining Out: 1 to 3 times a week       Micronutrient Intake  Vitamin Intake: D, B12     Medication and Complementary/Alternative Medicine Use  Prescription Medication Use: Norvasc, Candesartan    Physical Activities:  Physical Activity  Physical Activity History: No current PA. Plans to start working with a personal " ".  Consistency: No     Nutrition Focused Physical Exam:  Subcutaneous Fat Loss  Orbital Fat Pads: Defer (Virtual visit. Pt with no signs of malnutrition.)  Muscle Wasting  Temporalis: Defer (Virtual visit. Pt with no signs of malnutrition.)    Energy Needs  Calculated Energy Needs Using Equations  Height: 170.2 cm (5' 7\")  Weight Used for Equation Calculations: 104 kg (229 lb)  [facility] Equation (Overweight or Obese Patients): 1686  Equation Chosen to Use by RD: [facility]  Activity Factor: 1.2  Total Energy Needs: 2023  Estimated Fluid Needs  Total Fluid Estimated Needs (mL): 2023 mL  Method for Estimating Needs: 1ml/kcal  Estimated Protein Needs  Total Protein Estimated Needs (g): 83 g  Method for Estimating Needs: 0.8g/kg actual BW      Nutrition Diagnosis      Nutrition Diagnosis  Patient has Nutrition Diagnosis: Yes  Diagnosis Status (1): New  Nutrition Diagnosis 1: Excessive carbohydrate intake  Related to (1): physiological causes requiring use of modified carbohydrate intake  As Evidenced by (1): Pre diabetes dx, A1c 5.5  Additional Nutrition Diagnosis: Diagnosis 2  Diagnosis Status (2): New  Nutrition Diagnosis 2: Excessive fat intake  Related to (2): physiological causes requiring use of modified fat intake  As Evidenced by (2): elevated lipid labs of Cholesterol 200, , Trigs 151    Nutrition Interventions/Recommendations   Nutrition Prescription  Individualized Nutrition Prescription Provided for : Carbohydrate, Protein, and Fiber modified diet. Heart healthy diet  Food and Nutrition Delivery  Meals & Snacks: Carbohydrate-modified diet, Fiber-modified diet, Protein-modified diet  Goals: Pt will follow CHO counting/controlled diet, pairing CHO with protien and higher fiber sources of CHO. Pt will cosnume a heart healthy diet low in saturated fat, sodium, and high in fiber.  Nutrition Education  Nutrition Education Content: Content related nutrition education  Goals: Nutrition " education handouts on Heart healthy diet, fiber, and label reading emailed to patient. Patient goals: 1. Reduce bread, pasta, and rice. 2. Rinse canned fruits and vegetables before using. 3. Pre portion food in the morning in portions sizes. 4. Read nutrition labels and identify foods low in saturated fat, low in sodium, high in fiber, and low in added sugar.      Patient Instructions   Choose foods with less than 200mg of sodium per serving, and 2,000mg per day.    Consume foods low in saturated fat like lean proteins and low fat dairy.   Choose heart healthy fats like liquid vegetable oils, raw or unsalted nuts, fish and seafood, and reduced fat, whipped or liquid spreads.   4. Increase physical activity to 30 minutes of moderate exercise on most days.    Nutrition Monitoring and Evaluation   Food/Nutrient Related History Monitoring  Monitoring and Evaluation Plan: Carbohydrate intake, Fiber intake, Protein intake, Fat intake, Mineral/element intake  Estimated fat intake: Estimated fat intake  Criteria: Pt will reduce saturated fat intake by reducing fatty pieces of meat and fried foods.  Estimated protein intake: Estimated protein intake  Criteria: Pt will pair carbohydrate foods with protein foods to reduce BG spike.  Estimated carbohydrate intake: Estimated carbohydrate intake  Criteria: Pt will reduce CHO intake by following serving sizes on the nutrition label.  Estimated fiber intake: Estimated fiber intake  Criteria: Pt will consume higher fiber carbohydrate food options to reduce BG spike.  Mineral/Element Intake: Measured mineral/element intake  Criteria: Pt will reduce sodium intake by consuming foods with no more then 200mg of sodium per serving.  Biochemical Data, Medical Tests and Procedures  Monitoring and Evaluation Plan: Glucose/endocrine profile, Lipid profile  Glucose/Endocrine Profile: Glucose, casual, Glucose, fasting, Hemoglobin A1c (HgbA1c)  Criteria: Labs WNL      Time Spent  Time spent  directly with patient, family or caregiver: 45 minutes  Documentation Time: 15 minutes  Other Time Spent: 5 minutes        Readiness to Change : Good  Level of Understanding : Good  Anticipated Compliant : Good

## 2024-12-20 NOTE — PATIENT INSTRUCTIONS
Choose foods with less than 200mg of sodium per serving, and 2,000mg per day.    Consume foods low in saturated fat like lean proteins and low fat dairy.   Choose heart healthy fats like liquid vegetable oils, raw or unsalted nuts, fish and seafood, and reduced fat, whipped or liquid spreads.   4. Increase physical activity to 30 minutes of moderate exercise on most days.

## 2025-01-03 ENCOUNTER — HOSPITAL ENCOUNTER (OUTPATIENT)
Dept: RADIOLOGY | Facility: CLINIC | Age: 50
Discharge: HOME | End: 2025-01-03
Payer: COMMERCIAL

## 2025-01-03 DIAGNOSIS — Z12.31 BREAST CANCER SCREENING BY MAMMOGRAM: ICD-10-CM

## 2025-01-03 PROCEDURE — 77067 SCR MAMMO BI INCL CAD: CPT

## 2025-01-22 ENCOUNTER — HOSPITAL ENCOUNTER (EMERGENCY)
Facility: HOSPITAL | Age: 50
Discharge: HOME | End: 2025-01-22
Payer: COMMERCIAL

## 2025-01-22 VITALS
OXYGEN SATURATION: 99 % | HEIGHT: 67 IN | RESPIRATION RATE: 18 BRPM | BODY MASS INDEX: 34.06 KG/M2 | WEIGHT: 217 LBS | SYSTOLIC BLOOD PRESSURE: 135 MMHG | HEART RATE: 99 BPM | TEMPERATURE: 98 F | DIASTOLIC BLOOD PRESSURE: 89 MMHG

## 2025-01-22 DIAGNOSIS — J11.1 FLU: Primary | ICD-10-CM

## 2025-01-22 LAB
FLUAV RNA RESP QL NAA+PROBE: DETECTED
FLUBV RNA RESP QL NAA+PROBE: NOT DETECTED
RSV RNA RESP QL NAA+PROBE: NOT DETECTED
SARS-COV-2 RNA RESP QL NAA+PROBE: NOT DETECTED

## 2025-01-22 PROCEDURE — 87637 SARSCOV2&INF A&B&RSV AMP PRB: CPT

## 2025-01-22 PROCEDURE — 2500000004 HC RX 250 GENERAL PHARMACY W/ HCPCS (ALT 636 FOR OP/ED)

## 2025-01-22 PROCEDURE — 99283 EMERGENCY DEPT VISIT LOW MDM: CPT

## 2025-01-22 RX ORDER — ONDANSETRON 4 MG/1
4 TABLET, ORALLY DISINTEGRATING ORAL EVERY 8 HOURS PRN
Qty: 20 TABLET | Refills: 0 | Status: SHIPPED | OUTPATIENT
Start: 2025-01-22 | End: 2025-01-29

## 2025-01-22 RX ORDER — ONDANSETRON 4 MG/1
4 TABLET, ORALLY DISINTEGRATING ORAL ONCE
Status: COMPLETED | OUTPATIENT
Start: 2025-01-22 | End: 2025-01-22

## 2025-01-22 RX ADMIN — ONDANSETRON 4 MG: 4 TABLET, ORALLY DISINTEGRATING ORAL at 12:06

## 2025-01-22 ASSESSMENT — PAIN - FUNCTIONAL ASSESSMENT: PAIN_FUNCTIONAL_ASSESSMENT: 0-10

## 2025-01-22 ASSESSMENT — PAIN SCALES - GENERAL: PAINLEVEL_OUTOF10: 0 - NO PAIN

## 2025-01-22 NOTE — Clinical Note
Krystyna Arroyo was seen and treated in our emergency department on 1/22/2025.  She may return to work on 01/27/2025.       If you have any questions or concerns, please don't hesitate to call.      Mario Moreno PA-C

## 2025-01-22 NOTE — DISCHARGE INSTRUCTIONS
Can take Motrin and Tylenol over-the-counter as needed for fever and muscle aches  Can take Zofran 4 mg every 8 hours as needed for nausea  Please increase your oral intake of fluids  Follow-up with your primary care doctor for resolution of symptoms  Please return to ED for new or worsening symptoms including not limited to spiking fevers, uncontrollable vomiting, chest pain, trouble breathing, coughing up blood, or any other concern

## 2025-01-22 NOTE — ED TRIAGE NOTES
PT states having congestion, lethargy, diarrhea for past four days. At home meds not working. Pt having unproductive cough. Endorses nausea, no vomiting.

## 2025-01-22 NOTE — ED PROVIDER NOTES
HPI   Chief Complaint   Patient presents with    Flu Symptoms     Patient is a 49-year-old female presenting to the ED with concern for flulike symptoms.  Patient states she was at a party for work Friday night and her symptoms started on Saturday.  She has been having subjective fever, chills, myalgias, a dry cough, congestion, rhinorrhea, headache, nausea, and diarrhea.  Denies any chest pain, shortness of breath, vomiting, abdominal pain, otalgia, otorrhea, eye redness/pain, sinus pressure, and sore throat.  She has tried tylenol and dayton seltzer with mild relief.  Past medical history of hypertension, pre diabetes, and hyperlipidemia.         Patient History   Past Medical History:   Diagnosis Date    Encounter for examination of blood pressure without abnormal findings 06/26/2015    Blood pressure check    Herpes     Personal history of other diseases of the circulatory system 03/28/2016    History of hypertension    Personal history of other diseases of the circulatory system 06/26/2017    History of hypertension    Personal history of other diseases of the circulatory system 07/17/2017    History of hypertension    Personal history of other diseases of the circulatory system 08/09/2018    History of hypertension    Personal history of other diseases of the circulatory system 02/23/2017    History of hypertension    Personal history of other endocrine, nutritional and metabolic disease 04/24/2015    History of hyperlipidemia     Past Surgical History:   Procedure Laterality Date    APPENDECTOMY  06/17/2015    Appendectomy    HYSTERECTOMY       Family History   Problem Relation Name Age of Onset    Breast cancer Mother Moraima Arroyo 69    Hearing loss Mother Moraima Arroyo      Social History     Tobacco Use    Smoking status: Never    Smokeless tobacco: Never   Vaping Use    Vaping status: Never Used   Substance Use Topics    Alcohol use: Yes     Alcohol/week: 3.0 standard drinks of alcohol     Types: 3 Glasses of  wine per week    Drug use: Never       Physical Exam   ED Triage Vitals [01/22/25 1107]   Temperature Heart Rate Respirations BP   36.7 °C (98 °F) (!) 115 18 135/89      Pulse Ox Temp Source Heart Rate Source Patient Position   97 % Oral -- Sitting      BP Location FiO2 (%)     Right arm --       Physical Exam  Constitutional:       General: She is not in acute distress.     Appearance: Normal appearance. She is normal weight. She is ill-appearing. She is not toxic-appearing.   HENT:      Head: Normocephalic and atraumatic.      Right Ear: Tympanic membrane, ear canal and external ear normal. There is no impacted cerumen.      Left Ear: Tympanic membrane, ear canal and external ear normal. There is no impacted cerumen.      Nose: Congestion present. No rhinorrhea.      Mouth/Throat:      Mouth: Mucous membranes are moist.      Pharynx: Oropharynx is clear. No oropharyngeal exudate or posterior oropharyngeal erythema.   Cardiovascular:      Rate and Rhythm: Normal rate and regular rhythm.      Pulses: Normal pulses.      Heart sounds: Normal heart sounds. No murmur heard.     No friction rub. No gallop.   Pulmonary:      Effort: Pulmonary effort is normal. No respiratory distress.      Breath sounds: Normal breath sounds. No stridor. No wheezing, rhonchi or rales.   Abdominal:      General: Abdomen is flat. Bowel sounds are normal. There is no distension.      Palpations: Abdomen is soft.      Tenderness: There is no abdominal tenderness.   Musculoskeletal:         General: Normal range of motion.      Cervical back: Normal range of motion and neck supple. No rigidity or tenderness.   Lymphadenopathy:      Cervical: No cervical adenopathy.   Skin:     General: Skin is warm and dry.      Capillary Refill: Capillary refill takes less than 2 seconds.   Neurological:      General: No focal deficit present.      Mental Status: She is alert and oriented to person, place, and time.   Psychiatric:         Mood and Affect:  Mood normal.         Behavior: Behavior normal.       ED Course & MDM   ED Course as of 01/22/25 1254   Wed Jan 22, 2025   1209 Patient is a 49-year-old female presenting to the ED with concern for flulike symptoms.  Initial differentials include COVID, flu, RSV, pneumonia, bronchitis, sinusitis.  Patient is tachycardic at 115.  She is nontoxic-appearing.  Lungs are clear to auscultation.  Will obtain viral swabs for COVID, flu, and RSV for further evaluation.  Patient given Zofran and encouraged to drink fluids in ED. [VS]   1243 Flu A Result(!): Detected [VS]   1243 Flu B Result: Not Detected [VS]   1243 Coronavirus 2019, PCR: Not Detected [VS]   1243 RSV PCR: Not Detected [VS]   1253 Swabs positive for flu A.  Patient's heart rate came down to 99 after receiving Zofran and drinking fluids in ED.  Patient is stable for outpatient management.  Recommend taking Tylenol and Motrin as needed for fever or muscle aches.  Prescription sent for Zofran as needed for nausea.  Recommend patient follow-up with her primary care provider.  Patient told to return to ED for any new or worsening symptoms as outlined in discharge summary. [VS]      ED Course User Index  [VS] Mario Moreno PA-C         Diagnoses as of 01/22/25 1254   Flu                 No data recorded     Marbin Coma Scale Score: 15 (01/22/25 1108 : Sandor Bowden, EMT)                     Medical Decision Making  Chronic Medical Conditions Significantly Affecting Care:      Escalation of Care: Appropriate for outpatient    Counseling: Spoke with the patient and discussed today´s findings, in addition to providing specific details for the plan of care and expected course.  Patient was given the opportunity to ask questions.    Discussed return precautions and importance of follow-up.  Advised to follow-up with primary care provider.  Advised to return to the ED for changing or worsening symptoms, new symptoms, complaint specific precautions, and  precautions listed on the discharge paperwork.  Educated on the common potential side effects of medications prescribed.    I advised the patient that the emergency evaluation and treatment provided today doesn't end their need for medical care. It is very important that they follow-up with their primary care provider or other specialist as instructed.    The plan of care was mutually agreed upon with the patient. The patient and/or family were given the opportunity to ask questions. All questions asked today in the ED were answered to the best of my ability with today's information.    I specifically advised the patient to return to the ED for changing or worsening symptoms, worrisome new symptoms, or for any complaint specific precautions listed on the discharge paperwork.      Procedure  Procedures     Mario Moreno PA-C  01/22/25 9902

## 2025-01-23 ENCOUNTER — APPOINTMENT (OUTPATIENT)
Dept: OBSTETRICS AND GYNECOLOGY | Facility: CLINIC | Age: 50
End: 2025-01-23
Payer: COMMERCIAL

## 2025-02-14 ENCOUNTER — TELEMEDICINE CLINICAL SUPPORT (OUTPATIENT)
Dept: NUTRITION | Facility: HOSPITAL | Age: 50
End: 2025-02-14
Payer: COMMERCIAL

## 2025-02-14 VITALS — HEIGHT: 67 IN | BODY MASS INDEX: 33.99 KG/M2

## 2025-02-14 DIAGNOSIS — E78.5 HYPERLIPIDEMIA, UNSPECIFIED HYPERLIPIDEMIA TYPE: Primary | ICD-10-CM

## 2025-02-14 PROCEDURE — 97803 MED NUTRITION INDIV SUBSEQ: CPT | Mod: 95 | Performed by: FAMILY MEDICINE

## 2025-02-14 NOTE — PROGRESS NOTES
"Nutrition Follow Up Assessment:     Patient Krystyna Arroyo is a 50 y.o. female being seen via virtual visit, phone call only who was referred by Dr. Kika Fernandez for   1. Hyperlipidemia, unspecified hyperlipidemia type            Nutrition Assessment    Patient Active Problem List   Diagnosis    Anemia    Anxiety and depression    Herpes    Hyperlipidemia    Hypertension    Obesity (BMI 30-39.9)    Prediabetes    Vitamin D deficiency     Nutrition History:  Food & Nutrition History: Pt states that she has made a few dietary changes like decreasing prork and red meat, rice, and processed meats. She has been emotional eating but has been seeing a therapist and keeping a food and emotion log to help. Pt states that the handouts from our last visit were helpful and she has been referring to them for more options.  Food Allergies:    Food Intolerances: None  Vitamin/mineral intake:    Magnesium  Prescription medications: Prescription Medication Use: Norvasc    Diet Recall:  Meal 1: Breakfast- zuccini, onion, mini cumumbers, small avocado  Meal 2: Lunch- usually a snack like potaotes  Meal 3: Dinner- Salad kits with oil based dressing, ground chicken  Snacks: Cheese, beans, potatoes  Food Variety: Present  Oral Nutrition Supplement Use:  None  Fluid Intake: Matcha, black tea, Water  Energy Intake: Good > 75 %      Food Preparation:  Cooking: Patient  Grocery Shopping: Patient  Dining Out:  1-3x/week    Physical Activity:   Has goal to start going to the gym more often.    Food Security/Insecurity:  Not indicated    Anthropometrics:  Height: 170.2 cm (5' 7\")   Weight:  (217lb per pt chart)        IBW/kg (Dietitian Calculated): 61.3 kg Percent of IBW: 160 %        Weight History:   Daily Weight  01/22/25 : 98.4 kg (217 lb)  12/06/24 : 104 kg (229 lb)  06/07/24 : 108 kg (238 lb)  01/22/24 : 97.5 kg (215 lb)  11/22/23 : 101 kg (222 lb 10.6 oz)  11/13/23 : 101 kg (223 lb 6.4 oz)  01/04/23 : 104 kg (230 " lb)  12/29/22 : 106 kg (234 lb)  08/31/22 : 104 kg (229 lb)  07/12/22 : 103 kg (226 lb)    Weight Change %:  Weight History / % Weight Change: 12/6/24- 229lb; 1/22/25- 217lb 12lb (5%) intentional loss in 1.5 months.  Significant Weight Loss: Yes  Interpretation of Weight Loss: 5% in 1 month    Nutrition Focused Physical Exam Findings:  Deferred, appointment is either virtual or phone only    Nutrition Significant Labs:  CMP Trend:    Recent Labs     12/06/24  0956 11/13/23  1202 01/07/22  1419   GLUCOSE 100* 80 77    141 138   K 4.3 4.4 4.4    106 104   CO2 29 29 30   ANIONGAP 11 10 8*   BUN 10 15 12   CREATININE 0.85 0.87 0.80   EGFR 83 81  --    CALCIUM 9.4 9.4 9.6   ALBUMIN 4.2 4.3 4.5   ALKPHOS 76 66 67   PROT 7.5 7.3 7.7   AST 14 13 16   BILITOT 0.5 0.5 0.6   ALT 12 14 15   , DM Specific Labs Trend (includes HgbA1C):   Recent Labs     12/06/24  0956 11/13/23  1202 01/07/22  1419   HGBA1C 5.5 5.6 6.1*   , and Lipid Panel Trend:    Recent Labs     12/06/24  0956 11/13/23  1202 01/07/22  1419 02/22/21  1106 07/06/20  1154   CHOL 200* 185 211* 193 178   HDL 30.3 31.7 27.3* 25.3* 25.5*   LDLCALC 140* 100*  --   --   --    LDLF  --   --  129* 130* 124*   VLDL 30 54* 55* 37 28   TRIG 151* 269* 276* 187* 141       Medications:  Current Outpatient Medications   Medication Instructions    amLODIPine (NORVASC) 10 mg, oral, Daily    candesartan (ATACAND) 32 mg, oral, Daily    valACYclovir (VALTREX) 500 mg, oral, Daily        Estimated Needs:  Total Energy Estimated Needs in 24 hours (kCal): 1952 kCal; Method for Estimating Needs: MSJxAF 1.2  Total Protein Estimated Needs in 24 Hours (g): 79 g; Method for Estimating 24 Hour Protein Needs: 0.8g/kg actual BW   ;    Total Fluid Estimated Needs in 24 Hours (mL): 1952 mL; Method for Estimating 24 Hour Fluid Needs: 1ml/kcal       Nutrition Diagnosis      Nutrition Diagnosis  Patient has Nutrition Diagnosis: Yes  Diagnosis Status (1): Active  Nutrition Diagnosis 1:  Excessive carbohydrate intake  Related to (1): physiological causes requiring use of modified carbohydrate intake  As Evidenced by (1): Pre diabetes dx, A1c 5.5  Additional Nutrition Diagnosis: Diagnosis 2  Diagnosis Status (2): Active  Nutrition Diagnosis 2: Excessive fat intake  Related to (2): physiological causes requiring use of modified fat intake  As Evidenced by (2): elevated lipid labs of Cholesterol 200, , Trigs 151       Nutrition Interventions/Recommendations   Nutrition Prescription:  Heart Healthy Diet, Carbohydrate, Protein, and Fiber Modified Diet.     Nutrition Interventions:   Food and Nutrient Delivery: Meals & Snacks: Carbohydrate-modified diet, Fat-modified diet, Fiber-modified diet, Protein-modified diet, Mineral-modified diet  Goals: Pt will follow CHO counting/controlled diet, pairing CHO with protien and higher fiber sources of CHO. Pt will cosnume a heart healthy diet low in saturated fat, sodium, and high in fiber.     Coordination of Care:   N/A    Nutrition Education:   Nutrition Education Content: Content related nutrition education  Goals: 1. Continue to reduce bread, pasta, and rice. 2. Pre portion food in the morning in portions sizes. 2. Continue to reduce processed meats, pork, and red meat. 3. Try to go to the gym 1x/week for the next 6 weeks. 4. Try coffee alternatives like matcha and tea.    Nutrition Education Topics Discussed:   -Pairing all CHO sources with protein to help reduce BG spike. Consuming fibrous sources of CHO.   -Plant based sources of protein like beans, nuts, and seeds.     Educational Handouts Provided: None today.     Readiness to Change : Good  Level of Understanding : Good  Anticipated Compliant : Good       Nutrition Monitoring and Evaluation   Food and Nutrient Intake  Monitoring and Evaluation Plan: Fat intake, Protein intake, Fiber intake, Carbohydrate intake, Micronutrient intake determination  Estimated fat intake: Estimated fat  intake  Criteria: Pt will reduce saturated fat intake by reducing fatty pieces of meat and fried foods.  Estimated protein intake: Estimated protein intake  Criteria: Pt will pair protein source with CHO to reduce BG spike.  Estimated carbohydrate intake: Estimated carbohydrate intake  Criteria: Pt will reduce CHO intake by following serving sizes on the nutrition label.  Estimated fiber intake: Estimated fiber intake  Criteria: Pt will consume fiber sources of CHO for better BG control.  Micronutrient Intake Determination: Estimated mineral intake  Criteria: Pt will reduce sodium intake by consuming foods with no more then 200mg of sodium per serving.  Additional Plan: Evaluate nutrition intervention as compared to nutrition goal(s) and estimated nutrient need criteria.     Anthropometric measurements  Monitoring and Evaluation Plan: Weight  Body Weight: Measured body weight  Criteria: Monitor weight changes.    Biochemical Data, Medical Tests and Procedures  Monitoring and Evaluation Plan: Glucose/endocrine profile, Lipid profile  Glucose/Endocrine Profile: Glucose, casual, Glucose, fasting, Hemoglobin A1c (HgbA1c)  Criteria: Labs WNL  Lipid Profile:  (Cholesterol)  Criteria: Labs WNL  Criteria: Labs WNL       Follow Up: 6-8 weeks    Time Spent  Time spent directly with patient, family or caregiver: 25 minutes  Documentation Time: 10 minutes

## 2025-02-20 ENCOUNTER — APPOINTMENT (OUTPATIENT)
Dept: OBSTETRICS AND GYNECOLOGY | Facility: CLINIC | Age: 50
End: 2025-02-20
Payer: COMMERCIAL

## 2025-02-20 VITALS
HEIGHT: 67 IN | BODY MASS INDEX: 35.16 KG/M2 | SYSTOLIC BLOOD PRESSURE: 145 MMHG | WEIGHT: 224 LBS | DIASTOLIC BLOOD PRESSURE: 101 MMHG

## 2025-02-20 DIAGNOSIS — Z01.419 ENCOUNTER FOR ANNUAL ROUTINE GYNECOLOGICAL EXAMINATION: Primary | ICD-10-CM

## 2025-02-20 DIAGNOSIS — N95.0 PMB (POSTMENOPAUSAL BLEEDING): ICD-10-CM

## 2025-02-20 PROCEDURE — 3008F BODY MASS INDEX DOCD: CPT | Performed by: OBSTETRICS & GYNECOLOGY

## 2025-02-20 PROCEDURE — 99396 PREV VISIT EST AGE 40-64: CPT | Performed by: OBSTETRICS & GYNECOLOGY

## 2025-02-20 PROCEDURE — 81003 URINALYSIS AUTO W/O SCOPE: CPT | Performed by: OBSTETRICS & GYNECOLOGY

## 2025-02-20 PROCEDURE — 88175 CYTOPATH C/V AUTO FLUID REDO: CPT

## 2025-02-20 PROCEDURE — 88141 CYTOPATH C/V INTERPRET: CPT | Performed by: PATHOLOGY

## 2025-02-20 PROCEDURE — 3077F SYST BP >= 140 MM HG: CPT | Performed by: OBSTETRICS & GYNECOLOGY

## 2025-02-20 PROCEDURE — 1036F TOBACCO NON-USER: CPT | Performed by: OBSTETRICS & GYNECOLOGY

## 2025-02-20 PROCEDURE — 87624 HPV HI-RISK TYP POOLED RSLT: CPT

## 2025-02-20 PROCEDURE — 3080F DIAST BP >= 90 MM HG: CPT | Performed by: OBSTETRICS & GYNECOLOGY

## 2025-02-20 SDOH — ECONOMIC STABILITY: TRANSPORTATION INSECURITY
IN THE PAST 12 MONTHS, HAS LACK OF TRANSPORTATION KEPT YOU FROM MEETINGS, WORK, OR FROM GETTING THINGS NEEDED FOR DAILY LIVING?: NO

## 2025-02-20 SDOH — ECONOMIC STABILITY: FOOD INSECURITY: WITHIN THE PAST 12 MONTHS, YOU WORRIED THAT YOUR FOOD WOULD RUN OUT BEFORE YOU GOT MONEY TO BUY MORE.: NEVER TRUE

## 2025-02-20 SDOH — ECONOMIC STABILITY: FOOD INSECURITY: WITHIN THE PAST 12 MONTHS, THE FOOD YOU BOUGHT JUST DIDN'T LAST AND YOU DIDN'T HAVE MONEY TO GET MORE.: NEVER TRUE

## 2025-02-20 SDOH — ECONOMIC STABILITY: TRANSPORTATION INSECURITY
IN THE PAST 12 MONTHS, HAS THE LACK OF TRANSPORTATION KEPT YOU FROM MEDICAL APPOINTMENTS OR FROM GETTING MEDICATIONS?: NO

## 2025-02-20 ASSESSMENT — ENCOUNTER SYMPTOMS
ENDOCRINE NEGATIVE: 0
LOSS OF SENSATION IN FEET: 0
GASTROINTESTINAL NEGATIVE: 0
ALLERGIC/IMMUNOLOGIC NEGATIVE: 0
NEUROLOGICAL NEGATIVE: 0
MUSCULOSKELETAL NEGATIVE: 0
CARDIOVASCULAR NEGATIVE: 0
CONSTITUTIONAL NEGATIVE: 0
PSYCHIATRIC NEGATIVE: 0
EYES NEGATIVE: 0
OCCASIONAL FEELINGS OF UNSTEADINESS: 0
DEPRESSION: 0
RESPIRATORY NEGATIVE: 0
HEMATOLOGIC/LYMPHATIC NEGATIVE: 0

## 2025-02-20 ASSESSMENT — SOCIAL DETERMINANTS OF HEALTH (SDOH)
IN THE PAST 12 MONTHS, HAS THE ELECTRIC, GAS, OIL, OR WATER COMPANY THREATENED TO SHUT OFF SERVICE IN YOUR HOME?: NO
WITHIN THE LAST YEAR, HAVE TO BEEN RAPED OR FORCED TO HAVE ANY KIND OF SEXUAL ACTIVITY BY YOUR PARTNER OR EX-PARTNER?: NO
HOW HARD IS IT FOR YOU TO PAY FOR THE VERY BASICS LIKE FOOD, HOUSING, MEDICAL CARE, AND HEATING?: NOT VERY HARD
WITHIN THE LAST YEAR, HAVE YOU BEEN HUMILIATED OR EMOTIONALLY ABUSED IN OTHER WAYS BY YOUR PARTNER OR EX-PARTNER?: NO
WITHIN THE LAST YEAR, HAVE YOU BEEN KICKED, HIT, SLAPPED, OR OTHERWISE PHYSICALLY HURT BY YOUR PARTNER OR EX-PARTNER?: NO
WITHIN THE LAST YEAR, HAVE YOU BEEN AFRAID OF YOUR PARTNER OR EX-PARTNER?: NO

## 2025-02-20 ASSESSMENT — LIFESTYLE VARIABLES
HOW MANY STANDARD DRINKS CONTAINING ALCOHOL DO YOU HAVE ON A TYPICAL DAY: 1 OR 2
HOW OFTEN DO YOU HAVE SIX OR MORE DRINKS ON ONE OCCASION: LESS THAN MONTHLY
AUDIT-C TOTAL SCORE: 2
SKIP TO QUESTIONS 9-10: 0
HOW OFTEN DO YOU HAVE A DRINK CONTAINING ALCOHOL: MONTHLY OR LESS

## 2025-02-20 ASSESSMENT — PAIN SCALES - GENERAL: PAINLEVEL_OUTOF10: 0-NO PAIN

## 2025-02-20 NOTE — PROGRESS NOTES
"ANNUAL WELLNESS VISIT    Subjective :  Chief Complaint: Krystyna Arroyo is an 50 y.o. female here for an annual wellness visit and general medical care and f/u. Last pap 12/29/2022, WNL. Last mammogram 1/03/2025, normal.     HPI:  Very pleasant 50 year old post-menopausal female to the office for annual visit. Today she reports one episode of vaginal bleeding 1 month ago in which she noticed on the toilet paper after wiping. She has been using scented body wash which she states makes her skin dry and thinks the bleeding came from this. She does not report any pain or other symptoms. She did state her mother was diagnosed with breast cancer at age 70, but has no other family members with a diagnosis.    Most recent mammogram was normal.   Valacyclovir daily for HSV suppression, no current outbreaks.     Objective   BP (!) 145/101   Ht 1.702 m (5' 7\")   Wt 102 kg (224 lb)   BMI 35.08 kg/m²     Physical Exam  Vitals and nursing note reviewed.   HENT:      Head: Normocephalic.      Nose: Nose normal.      Mouth/Throat:      Mouth: Mucous membranes are moist.   Eyes:      Extraocular Movements: Extraocular movements intact.      Conjunctiva/sclera: Conjunctivae normal.      Pupils: Pupils are equal, round, and reactive to light.   Cardiovascular:      Rate and Rhythm: Normal rate and regular rhythm.      Heart sounds: Normal heart sounds.   Pulmonary:      Effort: Pulmonary effort is normal.      Breath sounds: Normal breath sounds.   Abdominal:      General: Bowel sounds are normal.      Palpations: Abdomen is soft.   Genitourinary:     General: Normal vulva.      Vagina: Normal.      Cervix: Normal.      Uterus: Normal.       Adnexa: Right adnexa normal.   Musculoskeletal:         General: Normal range of motion.      Cervical back: Normal range of motion.   Skin:     General: Skin is warm and dry.   Neurological:      Mental Status: She is alert and oriented to person, place, and time.   Psychiatric:         Mood " and Affect: Mood normal.         Behavior: Behavior normal.         Thought Content: Thought content normal.         Judgment: Judgment normal.       No active bleeding  Imaging:  No results found.     Labs reviewed:    Lab Results   Component Value Date    WBC 6.0 12/06/2024    HGB 14.2 12/06/2024    HCT 42.3 12/06/2024     12/06/2024    CHOL 200 (H) 12/06/2024    TRIG 151 (H) 12/06/2024    HDL 30.3 12/06/2024    ALT 12 12/06/2024    AST 14 12/06/2024     12/06/2024    K 4.3 12/06/2024     12/06/2024    CREATININE 0.85 12/06/2024    BUN 10 12/06/2024    CO2 29 12/06/2024    TSH 1.33 11/13/2023    INR 1.0 11/27/2017    HGBA1C 5.5 12/06/2024       Past Medical, Surgical, and Family History reviewed and updated in chart.    I have reviewed and reconciled the medication list with the patient today.   Current Outpatient Medications:     amLODIPine (Norvasc) 10 mg tablet, Take 1 tablet (10 mg) by mouth once daily., Disp: 90 tablet, Rfl: 1    candesartan (Atacand) 32 mg tablet, Take 1 tablet (32 mg) by mouth once daily., Disp: 90 tablet, Rfl: 1    valACYclovir (Valtrex) 500 mg tablet, Take 1 tablet (500 mg) by mouth once daily., Disp: 90 tablet, Rfl: 1     List of current healthcare providers:  Patient Care Team:  Kika Fernandez, DO as PCP - General  Kika Fernandez, DO as PCP - [facility] PCP      Steadi Fall Risk  One or more falls in the last year? No  How many Times?    Was the patient injured in the fall?    Has trouble stepping onto curb? No  Advised to use a cane or walker to get around safely? No  Often has to rush to toilet? No  Feels unsteady when walking? No  Has lost some feeling in feet? No  Often feels sad or depressed? No  Steadies self on furniture while walking at home? No  Takes medicine that makes them feel lightheaded or more tired than usual? No  Worried about Falling? No  Takes medicine to sleep or improve mood? No  Needs to push with hands when rising from a  chair? No                                  Assessment/Plan :  Problem List Items Addressed This Visit    None  Visit Diagnoses       Encounter for annual routine gynecological examination    -  Primary    Relevant Orders    THINPREP PAP TEST    PMB (postmenopausal bleeding)        Relevant Orders    US PELVIS TRANSABDOMINAL WITH TRANSVAGINAL          The following health maintenance schedule was reviewed with the patient and provided in printed form in the after visit summary:  Health Maintenance   Topic Date Due    MMR Vaccines (1 of 1 - Standard series) Never done    DTaP/Tdap/Td Vaccines (2 - Td or Tdap) 10/26/2023    Influenza Vaccine (1) 09/01/2024    HIV Screening  Completed    HPV Vaccines  Aged Out       Assessment/Plan  Pelvic ultrasound due to post-menopausal bleeding.  Otherwise normal annual exam.   Pap test sent today.   Mammogram normal in January 2025.   Follow up in 1 year or earlier if needed.              Orders Placed This Encounter   Procedures    US PELVIS TRANSABDOMINAL WITH TRANSVAGINAL     Standing Status:   Future     Standing Expiration Date:   2/20/2026     Order Specific Question:   Is the patient pregnant?     Answer:   No     Order Specific Question:   Reason for exam:     Answer:   Postmenopausal bleeding.  Check endometrial     Order Specific Question:   Radiologist to Determine Optimal Study     Answer:   Yes     Order Specific Question:   Release result to [employer]     Answer:   Immediate     Order Specific Question:   Is this exam part of a Research Study? If Yes, link this order to the research study     Answer:   No       Continue current medications as listed  Follow up in 1 year or earlier if needed     10:14 AM  02/20/25    GENARO Hightower

## 2025-02-20 NOTE — PROGRESS NOTES
Subjective   Patient ID: Krystyna Arroyo is a 50 y.o. female who presents for Annual Exam ( Patient has no pain or falls Last pap 12/29/22 wnl HPV negative last mammogram 1/3/25 benign no complaints or concerns no chaperone needed).  HPI    Review of Systems    Objective   Physical Exam    Assessment/Plan   See previously reviewed and signed note         Kyle Aguirre MD 02/20/25 12:59 PM

## 2025-03-04 ENCOUNTER — HOSPITAL ENCOUNTER (OUTPATIENT)
Dept: RADIOLOGY | Facility: HOSPITAL | Age: 50
Discharge: HOME | End: 2025-03-04
Payer: COMMERCIAL

## 2025-03-04 DIAGNOSIS — N95.0 PMB (POSTMENOPAUSAL BLEEDING): ICD-10-CM

## 2025-03-04 PROCEDURE — 76856 US EXAM PELVIC COMPLETE: CPT

## 2025-03-05 LAB
CYTOLOGY CMNT CVX/VAG CYTO-IMP: NORMAL
HPV HR 12 DNA GENITAL QL NAA+PROBE: NEGATIVE
HPV HR GENOTYPES PNL CVX NAA+PROBE: NEGATIVE
HPV16 DNA SPEC QL NAA+PROBE: NEGATIVE
HPV18 DNA SPEC QL NAA+PROBE: NEGATIVE
LAB AP HPV GENOTYPE QUESTION: YES
LAB AP HPV HR: NORMAL
LABORATORY COMMENT REPORT: NORMAL
PATH REPORT.TOTAL CANCER: NORMAL

## 2025-03-06 ENCOUNTER — APPOINTMENT (OUTPATIENT)
Dept: URGENT CARE | Age: 50
End: 2025-03-06
Payer: COMMERCIAL

## 2025-03-19 ENCOUNTER — APPOINTMENT (OUTPATIENT)
Dept: PRIMARY CARE | Facility: CLINIC | Age: 50
End: 2025-03-19
Payer: COMMERCIAL

## 2025-04-18 ENCOUNTER — TELEMEDICINE CLINICAL SUPPORT (OUTPATIENT)
Dept: NUTRITION | Facility: HOSPITAL | Age: 50
End: 2025-04-18
Payer: COMMERCIAL

## 2025-04-18 VITALS — HEIGHT: 67 IN | BODY MASS INDEX: 35.08 KG/M2

## 2025-04-18 DIAGNOSIS — E78.5 HYPERLIPIDEMIA, UNSPECIFIED HYPERLIPIDEMIA TYPE: Primary | ICD-10-CM

## 2025-04-18 PROCEDURE — 97803 MED NUTRITION INDIV SUBSEQ: CPT | Mod: 95 | Performed by: FAMILY MEDICINE

## 2025-04-18 NOTE — PROGRESS NOTES
----- Message from Hilda Hardy MD sent at 4/18/2024  5:11 PM EDT -----  Please call pt with results. Labs are notable for positive lupus anticoagulant. This is a condition that can contribute to pregnancy loss. Her testing 6mo ago was slightly abnormal, but not diagnostic for this. Unfortunately, these tests can be finic  ky and a true positive requires demonstration of persistent positive testing in 12wks. Please give instructions to complete and order. Would also rec f/u appt to discuss in detail. This can occur prior to 12wks for counseling and determining a plan.    Other notable labs --   - Many of the elevations are follow ups to [place] testing; appropriate for these results  - A1c and insulin are elevated. This indicates pre-diabetes, but is getting very close to the diabetic range. We'll chat at her f/u about this too, but should also consider touching base with her PCP  - Vitamin D is a touch low. Recommend 1696-1945 IU daily for supplementation   Nutrition Follow Up Assessment:     Patient Krystyna Arroyo is a 50 y.o. female being seen via virtual visit, phone call only who was referred by Dr. Kika Fernandez for   1. Hyperlipidemia, unspecified hyperlipidemia type          Virtual or Telephone Consent    An interactive audio and video telecommunication system which permits real time communications between the patient (at the originating site) and provider (at the distant site) was utilized to provide this telehealth service.   Verbal consent was requested and obtained from Krystyna Arroyo on this date, 25 for a telehealth visit and the patient's location was confirmed at the time of the visit.      Nutrition Assessment    Problem List[1]  Nutrition History:  Food & Nutrition History: Spoke with pt via phone and she states she has made a variety of changes like reading the food labels, increasing vegetables, and reducing sodium. She has been using a food diary delia that has helped her identify what is in foods and she states it has been very eye opening. She plans to continue tracking her food as it has been working for her. She does very well planning foods out by the days of the week, and will start consuming plant based proteins monday-friday, and meat proteins like chicken on saturday and . Pt plan to eventually transition to vegetarian. She has found a food plan that has meal ideas that she plans to follow. Pt goes for new labwork in .  Food Allergies:    Food Intolerances: None  Vitamin/mineral intake: D, B12  Magnesium  Prescription medications: Prescription Medication Use: Norvasc    Diet Recall:  Meal 1: Breakfast- When eatin-2 boiled eggs with bell peppers, mushrooms and zuccini  Meal 2: Lunch- Meal prepping with vegetables and protein  Meal 3: Dinner- plant based protein with veg during the week, chicken and veg on the weekends  Snacks: trail mix  Food Variety:  Present  Oral Nutrition Supplement Use:    Fluid Intake:  "Water  Energy Intake: Good > 75 %    Food Preparation:  Cooking: Patient  Grocery Shopping: Patient  Dining Out: 1 to 3 times a week    Physical Activity:   Started membership to [employer] and plans to start going. Has an online plan to help.    Food Security/Insecurity:  Not indicated    Anthropometrics:  Height: 170.2 cm (5' 7\")   Weight:  (224lb per pt chart.)                   Weight History:   Daily Weight  02/20/25 : 102 kg (224 lb)  01/22/25 : 98.4 kg (217 lb)  12/06/24 : 104 kg (229 lb)  06/07/24 : 108 kg (238 lb)  01/22/24 : 97.5 kg (215 lb)  11/22/23 : 101 kg (222 lb 10.6 oz)  11/13/23 : 101 kg (223 lb 6.4 oz)  01/04/23 : 104 kg (230 lb)  12/29/22 : 106 kg (234 lb)  08/31/22 : 104 kg (229 lb)    Weight Change %:  Weight History / % Weight Change: No recent significant weight changes per pt and chart.    Nutrition Focused Physical Exam Findings:  Deferred, appointment is either virtual or phone only    Nutrition Significant Labs:  CMP Trend:    Recent Labs     12/06/24  0956 11/13/23  1202 01/07/22  1419   GLUCOSE 100* 80 77    141 138   K 4.3 4.4 4.4    106 104   CO2 29 29 30   ANIONGAP 11 10 8*   BUN 10 15 12   CREATININE 0.85 0.87 0.80   EGFR 83 81  --    CALCIUM 9.4 9.4 9.6   ALBUMIN 4.2 4.3 4.5   ALKPHOS 76 66 67   PROT 7.5 7.3 7.7   AST 14 13 16   BILITOT 0.5 0.5 0.6   ALT 12 14 15   , DM Specific Labs Trend (Includes HgbA1C, antibodies & fasting insulin):   Recent Labs     12/06/24  0956 11/13/23  1202 01/07/22  1419   HGBA1C 5.5 5.6 6.1*   , and Lipid Panel Trend:    Recent Labs     12/06/24  0956 11/13/23  1202 01/07/22  1419 02/22/21  1106 07/06/20  1154   CHOL 200* 185 211* 193 178   HDL 30.3 31.7 27.3* 25.3* 25.5*   LDLCALC 140* 100*  --   --   --    LDLF  --   --  129* 130* 124*   VLDL 30 54* 55* 37 28   TRIG 151* 269* 276* 187* 141       Medications:  Current Outpatient Medications   Medication Instructions    amLODIPine (NORVASC) 10 mg, oral, Daily    candesartan (ATACAND) 32 " mg, oral, Daily    valACYclovir (VALTREX) 500 mg, oral, Daily      Estimated Needs:  Total Energy Estimated Needs in 24 hours (kCal): 1591 kCal; Method for Estimating Needs: MSJxAF 1.2 - 400kcal for weight loss.  Total Protein Estimated Needs in 24 Hours (g): 82 g; Method for Estimating 24 Hour Protein Needs: 0.8g/kg actual BW   ;    Total Fluid Estimated Needs in 24 Hours (mL): 1991 mL; Method for Estimating 24 Hour Fluid Needs: 1ml/kcal     Nutrition Diagnosis      Nutrition Diagnosis  Patient has Nutrition Diagnosis: Yes  Diagnosis Status (1): Active (Improving)  Nutrition Diagnosis 1: Excessive carbohydrate intake  Related to (1): physiological causes requiring use of modified carbohydrate intake  As Evidenced by (1): Pre diabetes dx, A1c 5.5  Additional Nutrition Diagnosis: Diagnosis 2  Diagnosis Status (2): Active (Improving)  Nutrition Diagnosis 2: Excessive fat intake  Related to (2): physiological causes requiring use of modified fat intake  As Evidenced by (2): elevated lipid labs of Cholesterol 200, , Trigs 151, BMI 35.08       Nutrition Interventions/Recommendations   Nutrition Prescription:  Carbohydrate-modified diet, Fat-modified diet, Fiber-modified diet, Protein-modified diet, Mineral-modified diet     Nutrition Interventions:   Food and Nutrient Delivery: Meals & Snacks: Carbohydrate-modified diet, Fat-modified diet, Fiber-modified diet, Protein-modified diet, Mineral-modified diet  Goals: Pt will follow CHO counting/controlled diet, pairing CHO with protien and higher fiber sources of CHO. Pt will cosnume a heart healthy diet low in saturated fat, sodium, and high in fiber. Continue to keep food diary to identify content in foods.     Coordination of Care:   N/A    Nutrition Education:   Nutrition Education Content: Content related nutrition education  Goals: Patient goals: 1. Limit foods at restaurants and choose healthy alternatives when at one. 2. Meal prep lunches for work following  meal plan. 3. Consume plant based proteins during the week and meat based proteins on the weekends. 4. Go to the gym 1x/week and increase as able. 5. Continue to reduce sodium and look at food labels.    Nutrition Education Topics Discussed:   -Plant based sources of protein  -Adequate protein intake when consuming limited meat  -Importance of fiber with each meal    Educational Handouts Provided: Plant based sources of protein  *Handouts emailed to patient.    Readiness to Change : Good  Level of Understanding : Good  Anticipated Compliant : Good       Nutrition Monitoring and Evaluation   Food and Nutrient Intake  Monitoring and Evaluation Plan: Fat intake, Protein intake, Fiber intake, Carbohydrate intake, Micronutrient intake determination  Estimated fat intake: Estimated fat intake  Criteria: Pt will reduce saturated fat intake by reducing fatty pieces of meat and fried foods.  Estimated protein intake: Estimated protein intake  Criteria: Pt will pair protein source with CHO to reduce BG spike. Consume a variety of plant based protein sources.  Estimated carbohydrate intake: Estimated carbohydrate intake  Criteria: Pt will reduce CHO intake by following serving sizes on the nutrition label.  Estimated fiber intake: Estimated fiber intake  Criteria: Pt will consume fiber sources of CHO for better BG control.  Micronutrient Intake Determination: Estimated mineral intake  Criteria: Pt will reduce sodium intake by consuming foods with no more then 200mg of sodium per serving.  Additional Plan: Evaluate nutrition intervention as compared to nutrition goal(s) and estimated nutrient need criteria.       Anthropometric measurements  Monitoring and Evaluation Plan: Weight  Body Weight: Measured body weight  Criteria: Monitor weight changes.    Biochemical Data, Medical Tests and Procedures  Monitoring and Evaluation Plan: Glucose/endocrine profile, Lipid profile  Glucose/Endocrine Profile: Glucose, casual, Glucose,  fasting, Hemoglobin A1c (HgbA1c)  Criteria: Labs WNL  Lipid Profile: Triglycerides, serum (BP, Cholesterol)       Follow Up: 2 months    Time Spent  Time spent directly with patient, family or caregiver: 25 minutes  Documentation Time: 10 minutes           [1]   Patient Active Problem List  Diagnosis    Anemia    Anxiety and depression    Herpes    Hyperlipidemia    Hypertension    Obesity (BMI 30-39.9)    Prediabetes    Vitamin D deficiency

## 2025-06-06 ENCOUNTER — APPOINTMENT (OUTPATIENT)
Dept: PRIMARY CARE | Facility: CLINIC | Age: 50
End: 2025-06-06
Payer: COMMERCIAL

## 2025-06-16 DIAGNOSIS — M79.672 LEFT FOOT PAIN: Primary | ICD-10-CM

## 2025-06-26 ENCOUNTER — APPOINTMENT (OUTPATIENT)
Dept: NUTRITION | Facility: HOSPITAL | Age: 50
End: 2025-06-26
Payer: COMMERCIAL

## 2025-07-02 ENCOUNTER — APPOINTMENT (OUTPATIENT)
Dept: PODIATRY | Facility: CLINIC | Age: 50
End: 2025-07-02
Payer: COMMERCIAL

## 2025-07-09 ENCOUNTER — APPOINTMENT (OUTPATIENT)
Dept: PRIMARY CARE | Facility: CLINIC | Age: 50
End: 2025-07-09
Payer: COMMERCIAL

## 2025-07-09 ENCOUNTER — HOSPITAL ENCOUNTER (OUTPATIENT)
Dept: RADIOLOGY | Facility: CLINIC | Age: 50
Discharge: HOME | End: 2025-07-09
Payer: COMMERCIAL

## 2025-07-09 VITALS
DIASTOLIC BLOOD PRESSURE: 78 MMHG | SYSTOLIC BLOOD PRESSURE: 122 MMHG | BODY MASS INDEX: 36.02 KG/M2 | OXYGEN SATURATION: 91 % | HEART RATE: 68 BPM | WEIGHT: 230 LBS | TEMPERATURE: 98 F

## 2025-07-09 DIAGNOSIS — E55.9 VITAMIN D INSUFFICIENCY: ICD-10-CM

## 2025-07-09 DIAGNOSIS — M79.672 LEFT FOOT PAIN: ICD-10-CM

## 2025-07-09 DIAGNOSIS — R73.03 PREDIABETES: ICD-10-CM

## 2025-07-09 DIAGNOSIS — F32.A ANXIETY AND DEPRESSION: ICD-10-CM

## 2025-07-09 DIAGNOSIS — Z00.00 ANNUAL PHYSICAL EXAM: Primary | ICD-10-CM

## 2025-07-09 DIAGNOSIS — I10 ESSENTIAL HYPERTENSION: ICD-10-CM

## 2025-07-09 DIAGNOSIS — Z00.00 HEALTHCARE MAINTENANCE: ICD-10-CM

## 2025-07-09 DIAGNOSIS — B00.9 HERPES: ICD-10-CM

## 2025-07-09 DIAGNOSIS — F41.9 ANXIETY AND DEPRESSION: ICD-10-CM

## 2025-07-09 DIAGNOSIS — Z13.29 SCREENING FOR THYROID DISORDER: ICD-10-CM

## 2025-07-09 PROCEDURE — 1036F TOBACCO NON-USER: CPT | Performed by: FAMILY MEDICINE

## 2025-07-09 PROCEDURE — 3074F SYST BP LT 130 MM HG: CPT | Performed by: FAMILY MEDICINE

## 2025-07-09 PROCEDURE — 73630 X-RAY EXAM OF FOOT: CPT | Mod: LEFT SIDE | Performed by: RADIOLOGY

## 2025-07-09 PROCEDURE — 99214 OFFICE O/P EST MOD 30 MIN: CPT | Performed by: FAMILY MEDICINE

## 2025-07-09 PROCEDURE — 73630 X-RAY EXAM OF FOOT: CPT | Mod: LT

## 2025-07-09 PROCEDURE — 3078F DIAST BP <80 MM HG: CPT | Performed by: FAMILY MEDICINE

## 2025-07-09 RX ORDER — VALACYCLOVIR HYDROCHLORIDE 500 MG/1
500 TABLET, FILM COATED ORAL DAILY
Qty: 90 TABLET | Refills: 1 | Status: SHIPPED | OUTPATIENT
Start: 2025-07-09

## 2025-07-09 RX ORDER — AMLODIPINE BESYLATE 10 MG/1
10 TABLET ORAL DAILY
Qty: 90 TABLET | Refills: 1 | Status: SHIPPED | OUTPATIENT
Start: 2025-07-09

## 2025-07-09 RX ORDER — CANDESARTAN 32 MG/1
32 TABLET ORAL DAILY
Qty: 90 TABLET | Refills: 0 | Status: SHIPPED | OUTPATIENT
Start: 2025-07-09

## 2025-07-09 ASSESSMENT — PAIN SCALES - GENERAL: PAINLEVEL_OUTOF10: 0-NO PAIN

## 2025-07-09 NOTE — PROGRESS NOTES
Subjective   Patient ID: Krystyna Arroyo is a 50 y.o. female who presents for Follow-up (6 month follow up ).    HPI   Patient is in the office today for a follow-up.    She would like  to lose weight. She was having dietician follow-ups with little improvement of her weight, but states this is due to her own inconsistency, she feels she has learned a lot about changing her eating habits and eating a healthier diet. She states that she is using a supplement to increase her energy also.     Her blood pressure (122/78) was within good range when checked in the clinic today. She continues on amlodipine 10 mg daily and candesartan 32 mg daily for treatment of hypertension. She denies any side-effects to her amlodipine and/or candesartan medications. She denies any chest pain and/or SOB symptoms.    She is on valacyclovir 500 mg daily for treatment/prevention of HSV outbreaks. She denies any side-effects to her valacyclovir medication.  The medication is working well, she has not had any outbreaks    Her las PAP smear was in February 2025. Her last screening mammogram was in January 2025. Her last cologuard for screening for colon cancer was in March 2023 and was negative.    She had labs and late 2024, reviewed those today.  She would like to repeat her labs and see if they have improved since working with the dietitian.  She has a follow-up appointment with dietitian on July 17.  She is fasting today to have her labs redrawn    Patient has had some problems with mild depression off-and-on.  She has continued with therapist and has found this very helpful and feels her depression symptoms are in a remission  Review of Systems    Objective   /78 (BP Location: Left arm, Patient Position: Sitting, BP Cuff Size: Adult)   Pulse 68   Temp 36.7 °C (98 °F) (Temporal)   Wt 104 kg (230 lb)   SpO2 91%   BMI 36.02 kg/m²     Physical Exam  Neck:      Vascular: No carotid bruit.   Cardiovascular:      Rate and Rhythm:  Normal rate and regular rhythm.      Pulses: Normal pulses.      Heart sounds: Normal heart sounds.   Pulmonary:      Effort: Pulmonary effort is normal.      Breath sounds: Normal breath sounds.   Musculoskeletal:         General: Normal range of motion.      Cervical back: Normal range of motion.      Right lower leg: No edema.      Left lower leg: No edema.   Lymphadenopathy:      Cervical: No cervical adenopathy.   Skin:     General: Skin is warm and dry.   Neurological:      Mental Status: She is alert and oriented to person, place, and time.   Psychiatric:         Mood and Affect: Mood normal.         Thought Content: Thought content normal.         Judgment: Judgment normal.         Assessment/Plan   Diagnoses and all orders for this visit:  Annual physical exam  -     CBC; Future  -     Comprehensive Metabolic Panel; Future  -     Lipid Panel; Future  -     TSH with reflex to Free T4 if abnormal; Future  Prediabetes  -     Hemoglobin A1c; Future  Essential hypertension  -     amLODIPine (Norvasc) 10 mg tablet; Take 1 tablet (10 mg) by mouth once daily.  -     candesartan (Atacand) 32 mg tablet; Take 1 tablet (32 mg) by mouth once daily.  Anxiety and depression  Herpes  Vitamin D insufficiency  -     Vitamin D 25-Hydroxy,Total (for eval of Vitamin D levels); Future  Healthcare maintenance  -     valACYclovir (Valtrex) 500 mg tablet; Take 1 tablet (500 mg) by mouth once daily.  Screening for thyroid disorder  -     TSH with reflex to Free T4 if abnormal; Future    Recommended lab tests today as she is fasting. Will call her to discuss lab results.  Medications refilled today. Instructed to take medications as prescribed.  Reviewed her supplements and discussed they are okay for her to continue, she was concerned if they would interfere with her blood pressure medication.  Discussed diet and exercise and strategies to help her with consistency so she can reach her goals  Follow-up in 6 months, call us if needed  sooner.          Scribe Attestation  By signing my name below, I, Philipp Gentile   attest that this documentation has been prepared under the direction and in the presence of Kika Fernandez DO.

## 2025-07-10 LAB
25(OH)D3+25(OH)D2 SERPL-MCNC: 40 NG/ML (ref 30–100)
ALBUMIN SERPL-MCNC: 4.5 G/DL (ref 3.6–5.1)
ALP SERPL-CCNC: 71 U/L (ref 37–153)
ALT SERPL-CCNC: 23 U/L (ref 6–29)
ANION GAP SERPL CALCULATED.4IONS-SCNC: 10 MMOL/L (CALC) (ref 7–17)
AST SERPL-CCNC: 20 U/L (ref 10–35)
BILIRUB SERPL-MCNC: 0.7 MG/DL (ref 0.2–1.2)
BUN SERPL-MCNC: 20 MG/DL (ref 7–25)
CALCIUM SERPL-MCNC: 9.5 MG/DL (ref 8.6–10.4)
CHLORIDE SERPL-SCNC: 104 MMOL/L (ref 98–110)
CHOLEST SERPL-MCNC: 226 MG/DL
CHOLEST/HDLC SERPL: 6.5 (CALC)
CO2 SERPL-SCNC: 26 MMOL/L (ref 20–32)
CREAT SERPL-MCNC: 0.88 MG/DL (ref 0.5–1.03)
EGFRCR SERPLBLD CKD-EPI 2021: 79 ML/MIN/1.73M2
ERYTHROCYTE [DISTWIDTH] IN BLOOD BY AUTOMATED COUNT: 13.6 % (ref 11–15)
EST. AVERAGE GLUCOSE BLD GHB EST-MCNC: 134 MG/DL
EST. AVERAGE GLUCOSE BLD GHB EST-SCNC: 7.4 MMOL/L
GLUCOSE SERPL-MCNC: 82 MG/DL (ref 65–99)
HBA1C MFR BLD: 6.3 %
HCT VFR BLD AUTO: 44.3 % (ref 35–45)
HDLC SERPL-MCNC: 35 MG/DL
HGB BLD-MCNC: 14.6 G/DL (ref 11.7–15.5)
LDLC SERPL CALC-MCNC: 153 MG/DL (CALC)
MCH RBC QN AUTO: 29.9 PG (ref 27–33)
MCHC RBC AUTO-ENTMCNC: 33 G/DL (ref 32–36)
MCV RBC AUTO: 90.6 FL (ref 80–100)
NONHDLC SERPL-MCNC: 191 MG/DL (CALC)
PLATELET # BLD AUTO: 291 THOUSAND/UL (ref 140–400)
PMV BLD REES-ECKER: 10 FL (ref 7.5–12.5)
POTASSIUM SERPL-SCNC: 4 MMOL/L (ref 3.5–5.3)
PROT SERPL-MCNC: 7.8 G/DL (ref 6.1–8.1)
RBC # BLD AUTO: 4.89 MILLION/UL (ref 3.8–5.1)
SODIUM SERPL-SCNC: 140 MMOL/L (ref 135–146)
TRIGL SERPL-MCNC: 214 MG/DL
TSH SERPL-ACNC: 2 MIU/L
WBC # BLD AUTO: 7 THOUSAND/UL (ref 3.8–10.8)

## 2025-07-17 ENCOUNTER — TELEMEDICINE CLINICAL SUPPORT (OUTPATIENT)
Dept: NUTRITION | Facility: HOSPITAL | Age: 50
End: 2025-07-17
Payer: COMMERCIAL

## 2025-07-17 VITALS — BODY MASS INDEX: 36.02 KG/M2 | HEIGHT: 67 IN

## 2025-07-17 DIAGNOSIS — E78.5 HYPERLIPIDEMIA, UNSPECIFIED HYPERLIPIDEMIA TYPE: Primary | ICD-10-CM

## 2025-07-17 PROCEDURE — 97803 MED NUTRITION INDIV SUBSEQ: CPT | Mod: 95

## 2025-07-17 RX ORDER — ROSUVASTATIN CALCIUM 10 MG/1
10 TABLET, COATED ORAL DAILY
Qty: 30 TABLET | Refills: 3 | Status: SHIPPED | OUTPATIENT
Start: 2025-07-17

## 2025-07-17 NOTE — PROGRESS NOTES
Nutrition Follow Up Assessment:     Patient Krystyna Arroyo is a 50 y.o. female being seen via virtual visit, phone call only who was referred by Dr. Kika Fernandez for   1. Hyperlipidemia, unspecified hyperlipidemia type          Virtual or Telephone Consent    An interactive audio and video telecommunication system which permits real time communications between the patient (at the originating site) and provider (at the distant site) was utilized to provide this telehealth service.   Verbal consent was requested and obtained from Krystyna Arroyo on this date, 07/17/25 for a telehealth visit and the patient's location was confirmed at the time of the visit.    Nutrition Assessment    Problem List[1]  Nutrition History:  Food & Nutrition History: Pt states she is not doing as well as she would have liked. She went for blood work in the past month and her Cholesterol has gone up. She was prescribed medication for cholesterol and had it filled, but states she will not take it after reading the side effects. I encouraged the pt to call provider to let them know, and discuss her concerns. She plans to reach out to her provider tomorrow. Pt has come up with a realistic and attainable healthy meal plan that she would like to follow. This meal plan focuses on increasing vegetables and beans for fiber, and decreasing meat consumption. She has a goal to eventually go all plant based, but likes to make small goals to make them more attainable. She is also starting a workout plan this week, and plans to start small and increase the exercise as able. Pt reports that her biggest barrier will be eating out at restaurants with friends and family, but plans to eat before going out or choosing a healthier option on the menu like a salad or vegetable.  Food Allergies:    Food Intolerances: None  Vitamin/mineral intake:       Prescription medications:  Norvasc    Diet Recall:  Meal 1: Breakfast- Smoothie with black and matthew  "beans  Meal 2: Lunch-Salads with vinaigrette   Meal 3:    Snacks:  Varies  Food Variety: Present  Oral Nutrition Supplement Use:  None  Fluid Intake: Water (2x64oz bottle), grean tea,  Energy Intake: Good > 75 %    Food Preparation:  Cooking: Patient  Grocery Shopping: Patient  Dining Out: 1 to 3 times a week    Physical Activity:   Started working out this week at the gym in her appartment. She does a variety of treadmill, core, and body weight movements.    Food Security/Insecurity:  Not indicated    Anthropometrics:  Height: 170.2 cm (5' 7\")   Weight:  (230lb per pt chart)                   Weight History:   Daily Weight  07/09/25 : 104 kg (230 lb)  02/20/25 : 102 kg (224 lb)  01/22/25 : 98.4 kg (217 lb)  12/06/24 : 104 kg (229 lb)  06/07/24 : 108 kg (238 lb)  01/22/24 : 97.5 kg (215 lb)  11/22/23 : 101 kg (222 lb 10.6 oz)  11/13/23 : 101 kg (223 lb 6.4 oz)  01/04/23 : 104 kg (230 lb)  12/29/22 : 106 kg (234 lb)    Weight Change %:  Weight History / % Weight Change: 6lb weight gain in 5 months    Nutrition Focused Physical Exam Findings:  Deferred, appointment is either virtual or phone only      Nutrition Significant Labs:  CMP Trend:    Recent Labs     07/09/25  1523 12/06/24  0956 11/13/23  1202   GLUCOSE 82 100* 80    140 141   K 4.0 4.3 4.4    104 106   CO2 26 29 29   ANIONGAP 10 11 10   BUN 20 10 15   CREATININE 0.88 0.85 0.87   EGFR 79 83 81   CALCIUM 9.5 9.4 9.4   ALBUMIN 4.5 4.2 4.3   ALKPHOS 71 76 66   PROT 7.8 7.5 7.3   AST 20 14 13   BILITOT 0.7 0.5 0.5   ALT 23 12 14   , DM Specific Labs Trend (Includes HgbA1C, antibodies & fasting insulin):   Recent Labs     07/09/25  1523 12/06/24  0956 11/13/23  1202   HGBA1C 6.3* 5.5 5.6   , and Lipid Panel Trend:    Recent Labs     07/09/25  1523 12/06/24  0956 11/13/23  1202 01/07/22  1419 02/22/21  1106 07/06/20  1154   CHOL 226* 200* 185 211* 193 178   HDL 35* 30.3 31.7 27.3* 25.3* 25.5*   LDLCALC 153* 140* 100*  --   --   --    LDLF  --   --   -- "  129* 130* 124*   VLDL  --  30 54* 55* 37 28   TRIG 214* 151* 269* 276* 187* 141       Medications:  Current Outpatient Medications   Medication Instructions    amLODIPine (NORVASC) 10 mg, oral, Daily    candesartan (ATACAND) 32 mg, oral, Daily    rosuvastatin (CRESTOR) 10 mg, oral, Daily    valACYclovir (VALTREX) 500 mg, oral, Daily        Estimated Needs:  Total Energy Estimated Needs in 24 hours (kCal): 1623 kCal; Method for Estimating Needs: MSJxAF 1.2 - 400kcal for weight loss.  Total Protein Estimated Needs in 24 Hours (g): 83 g; Method for Estimating 24 Hour Protein Needs: 0.8g/kg actual BW   ;    Total Fluid Estimated Needs in 24 Hours (mL): 2023 mL; Method for Estimating 24 Hour Fluid Needs: 1ml/kcal       Nutrition Diagnosis      Nutrition Diagnosis  Patient has Nutrition Diagnosis: Yes  Diagnosis Status (1): Active (Improving)  Nutrition Diagnosis 1: Excessive carbohydrate intake  Related to (1): physiological causes requiring use of modified carbohydrate intake  As Evidenced by (1): Pre diabetes dx, A1c 5.5  Additional Nutrition Diagnosis: Diagnosis 2  Diagnosis Status (2): Active (Improving)  Nutrition Diagnosis 2: Excessive fat intake  Related to (2): physiological causes requiring use of modified fat intake  As Evidenced by (2): elevated lipid labs of Cholesterol 200, , Trigs 151, BMI 35.08       Nutrition Interventions/Recommendations   Nutrition Prescription:   Carbohydrate-modified diet, Fat-modified diet, Fiber-modified diet, Protein-modified diet, Mineral-modified diet     Nutrition Interventions:   Food and Nutrient Delivery: Meals & Snacks: Carbohydrate-modified diet, Fat-modified diet, Fiber-modified diet, Protein-modified diet, Mineral-modified diet  Goals: Pt will follow CHO counting/controlled diet, pairing CHO with protien and higher fiber sources of CHO. Pt will cosnume a heart healthy diet low in saturated fat, sodium, and high in fiber. Continue to keep food diary to identify  content in foods.     Coordination of Care:   N/A    Nutrition Education:   Nutrition Education Content: Content related nutrition education  Goals: Patient goals: 1. Follow meal plan with increased vegetables/fiber to help reduce cholesterol levels. 2. Make own olive oil based salad dressing for lunches. 3. Continue workout plan in apartment gym 5x/week or as able.    Nutrition Education Topics Discussed:   - Increasing fiber foods like vegetables, fruits, and beans to help reduce cholesterol  - Using oil and vinegar as base of dressings for healthier salad dressing options  -Breaking goals down into small attainable pieces to best obtain goals    Educational Handouts Provided: None today    Readiness to Change : Good  Level of Understanding : Good  Anticipated Compliant : Good       Nutrition Monitoring and Evaluation   Food and Nutrient Intake  Monitoring and Evaluation Plan: Fat intake, Protein intake, Fiber intake, Carbohydrate intake, Micronutrient intake determination  Estimated fat intake: Estimated fat intake  Criteria: Pt will reduce saturated fat intake by reducing fatty pieces of meat and fried foods.  Estimated protein intake: Estimated protein intake  Criteria: Pt will pair protein source with CHO to reduce BG spike. Consume a variety of plant based protein sources.  Estimated carbohydrate intake: Estimated carbohydrate intake  Criteria: Pt will reduce CHO intake by following serving sizes on the nutrition label.  Estimated fiber intake: Estimated fiber intake  Criteria: Pt will consume fiber sources of CHO for better BG control.  Micronutrient Intake Determination: Estimated mineral intake  Criteria: Pt will reduce sodium intake by consuming foods with no more then 200mg of sodium per serving.  Additional Plan: Evaluate nutrition intervention as compared to nutrition goal(s) and estimated nutrient need criteria.     Anthropometric measurements  Monitoring and Evaluation Plan: Weight  Body Weight:  Measured body weight  Criteria: Monitor weight changes.    Biochemical Data, Medical Tests and Procedures  Monitoring and Evaluation Plan: Glucose/endocrine profile, Lipid profile  Glucose/Endocrine Profile: Glucose, casual, Glucose, fasting, Hemoglobin A1c (HgbA1c)  Criteria: Labs WNL  Lipid Profile:  (Cholesterol, HDL)  Criteria: Labs WNL  Criteria: Labs WNL         Follow Up: 2 months    Time Spent  Time spent directly with patient, family or caregiver: 30 minutes  Documentation Time: 10 minutes           [1]   Patient Active Problem List  Diagnosis    Anemia    Anxiety and depression    Herpes    Hyperlipidemia    Hypertension    Obesity (BMI 30-39.9)    Prediabetes    Vitamin D deficiency

## 2025-08-13 ENCOUNTER — APPOINTMENT (OUTPATIENT)
Dept: PODIATRY | Facility: CLINIC | Age: 50
End: 2025-08-13
Payer: COMMERCIAL

## 2025-08-13 DIAGNOSIS — M79.672 LEFT FOOT PAIN: Primary | ICD-10-CM

## 2025-08-13 DIAGNOSIS — M20.12 HALLUX VALGUS OF LEFT FOOT: ICD-10-CM

## 2025-08-13 PROCEDURE — 99203 OFFICE O/P NEW LOW 30 MIN: CPT | Performed by: PODIATRIST

## 2025-08-13 PROCEDURE — 1036F TOBACCO NON-USER: CPT | Performed by: PODIATRIST

## 2025-09-06 ENCOUNTER — APPOINTMENT (OUTPATIENT)
Dept: RADIOLOGY | Facility: HOSPITAL | Age: 50
End: 2025-09-06
Payer: COMMERCIAL

## 2025-09-06 ENCOUNTER — HOSPITAL ENCOUNTER (EMERGENCY)
Facility: HOSPITAL | Age: 50
Discharge: HOME | End: 2025-09-06
Payer: COMMERCIAL

## 2025-09-06 VITALS
SYSTOLIC BLOOD PRESSURE: 138 MMHG | OXYGEN SATURATION: 98 % | RESPIRATION RATE: 18 BRPM | HEART RATE: 82 BPM | DIASTOLIC BLOOD PRESSURE: 78 MMHG | TEMPERATURE: 98.6 F

## 2025-09-06 DIAGNOSIS — R03.0 ELEVATED BLOOD PRESSURE READING: ICD-10-CM

## 2025-09-06 DIAGNOSIS — S99.921A INJURY OF RIGHT FOOT, INITIAL ENCOUNTER: Primary | ICD-10-CM

## 2025-09-06 PROCEDURE — 73630 X-RAY EXAM OF FOOT: CPT | Mod: RT

## 2025-09-06 PROCEDURE — 99283 EMERGENCY DEPT VISIT LOW MDM: CPT

## 2025-09-06 PROCEDURE — 2500000001 HC RX 250 WO HCPCS SELF ADMINISTERED DRUGS (ALT 637 FOR MEDICARE OP)

## 2025-09-06 RX ORDER — NAPROXEN 500 MG/1
500 TABLET ORAL ONCE
Status: COMPLETED | OUTPATIENT
Start: 2025-09-06 | End: 2025-09-06

## 2025-09-06 RX ORDER — ACETAMINOPHEN 325 MG/1
975 TABLET ORAL ONCE
Status: COMPLETED | OUTPATIENT
Start: 2025-09-06 | End: 2025-09-06

## 2025-09-06 RX ADMIN — ACETAMINOPHEN 975 MG: 325 TABLET ORAL at 11:03

## 2025-09-06 RX ADMIN — NAPROXEN 500 MG: 500 TABLET ORAL at 11:03

## 2025-09-06 ASSESSMENT — PAIN - FUNCTIONAL ASSESSMENT: PAIN_FUNCTIONAL_ASSESSMENT: 0-10

## 2025-09-06 ASSESSMENT — PAIN SCALES - GENERAL: PAINLEVEL_OUTOF10: 7

## 2025-10-03 ENCOUNTER — APPOINTMENT (OUTPATIENT)
Dept: PRIMARY CARE | Facility: CLINIC | Age: 50
End: 2025-10-03
Payer: COMMERCIAL